# Patient Record
Sex: FEMALE | Race: WHITE | NOT HISPANIC OR LATINO
[De-identification: names, ages, dates, MRNs, and addresses within clinical notes are randomized per-mention and may not be internally consistent; named-entity substitution may affect disease eponyms.]

---

## 2018-11-24 ENCOUNTER — TRANSCRIPTION ENCOUNTER (OUTPATIENT)
Age: 53
End: 2018-11-24

## 2020-11-11 ENCOUNTER — TRANSCRIPTION ENCOUNTER (OUTPATIENT)
Age: 55
End: 2020-11-11

## 2022-04-11 ENCOUNTER — APPOINTMENT (OUTPATIENT)
Dept: ORTHOPEDIC SURGERY | Facility: CLINIC | Age: 57
End: 2022-04-11
Payer: COMMERCIAL

## 2022-04-11 VITALS — HEIGHT: 63 IN | WEIGHT: 120 LBS | BODY MASS INDEX: 21.26 KG/M2

## 2022-04-11 DIAGNOSIS — M79.641 PAIN IN RIGHT HAND: ICD-10-CM

## 2022-04-11 DIAGNOSIS — Z78.9 OTHER SPECIFIED HEALTH STATUS: ICD-10-CM

## 2022-04-11 DIAGNOSIS — S60.229A CONTUSION OF UNSPECIFIED HAND, INITIAL ENCOUNTER: ICD-10-CM

## 2022-04-11 PROBLEM — Z00.00 ENCOUNTER FOR PREVENTIVE HEALTH EXAMINATION: Status: ACTIVE | Noted: 2022-04-11

## 2022-04-11 PROCEDURE — 73130 X-RAY EXAM OF HAND: CPT | Mod: RT

## 2022-04-11 PROCEDURE — 99203 OFFICE O/P NEW LOW 30 MIN: CPT

## 2022-04-11 NOTE — PHYSICAL EXAM
[de-identified] : right hand: \par ttp 4th metacarpal\par FAROM\par NVID [FreeTextEntry1] : healed 4th MC fracture (old) no acute fractures

## 2023-03-01 ENCOUNTER — TRANSCRIPTION ENCOUNTER (OUTPATIENT)
Age: 58
End: 2023-03-01

## 2023-03-02 ENCOUNTER — INPATIENT (INPATIENT)
Facility: HOSPITAL | Age: 58
LOS: 7 days | Discharge: HOME | End: 2023-03-10
Attending: SURGERY | Admitting: SURGERY
Payer: COMMERCIAL

## 2023-03-02 ENCOUNTER — TRANSCRIPTION ENCOUNTER (OUTPATIENT)
Age: 58
End: 2023-03-02

## 2023-03-02 VITALS
OXYGEN SATURATION: 100 % | DIASTOLIC BLOOD PRESSURE: 54 MMHG | SYSTOLIC BLOOD PRESSURE: 97 MMHG | HEART RATE: 91 BPM | TEMPERATURE: 100 F | RESPIRATION RATE: 18 BRPM

## 2023-03-02 DIAGNOSIS — Z98.890 OTHER SPECIFIED POSTPROCEDURAL STATES: Chronic | ICD-10-CM

## 2023-03-02 DIAGNOSIS — K63.1 PERFORATION OF INTESTINE (NONTRAUMATIC): ICD-10-CM

## 2023-03-02 LAB
ALBUMIN SERPL ELPH-MCNC: 4.2 G/DL — SIGNIFICANT CHANGE UP (ref 3.3–5)
ALP SERPL-CCNC: 73 U/L — SIGNIFICANT CHANGE UP (ref 40–120)
ALT FLD-CCNC: 49 U/L — HIGH (ref 4–33)
ANION GAP SERPL CALC-SCNC: 13 MMOL/L — SIGNIFICANT CHANGE UP (ref 7–14)
APTT BLD: 29.9 SEC — SIGNIFICANT CHANGE UP (ref 27–36.3)
AST SERPL-CCNC: 40 U/L — HIGH (ref 4–32)
BASOPHILS # BLD AUTO: 0.02 K/UL — SIGNIFICANT CHANGE UP (ref 0–0.2)
BASOPHILS NFR BLD AUTO: 0.2 % — SIGNIFICANT CHANGE UP (ref 0–2)
BILIRUB SERPL-MCNC: 0.6 MG/DL — SIGNIFICANT CHANGE UP (ref 0.2–1.2)
BLD GP AB SCN SERPL QL: NEGATIVE — SIGNIFICANT CHANGE UP
BLOOD GAS ARTERIAL - LYTES,HGB,ICA,LACT RESULT: SIGNIFICANT CHANGE UP
BLOOD GAS VENOUS COMPREHENSIVE RESULT: SIGNIFICANT CHANGE UP
BUN SERPL-MCNC: 25 MG/DL — HIGH (ref 7–23)
CALCIUM SERPL-MCNC: 9 MG/DL — SIGNIFICANT CHANGE UP (ref 8.4–10.5)
CHLORIDE SERPL-SCNC: 101 MMOL/L — SIGNIFICANT CHANGE UP (ref 98–107)
CO2 SERPL-SCNC: 24 MMOL/L — SIGNIFICANT CHANGE UP (ref 22–31)
CREAT SERPL-MCNC: 0.7 MG/DL — SIGNIFICANT CHANGE UP (ref 0.5–1.3)
EGFR: 102 ML/MIN/1.73M2 — SIGNIFICANT CHANGE UP
EOSINOPHIL # BLD AUTO: 0 K/UL — SIGNIFICANT CHANGE UP (ref 0–0.5)
EOSINOPHIL NFR BLD AUTO: 0 % — SIGNIFICANT CHANGE UP (ref 0–6)
GLUCOSE SERPL-MCNC: 95 MG/DL — SIGNIFICANT CHANGE UP (ref 70–99)
HCT VFR BLD CALC: 39 % — SIGNIFICANT CHANGE UP (ref 34.5–45)
HGB BLD-MCNC: 12.2 G/DL — SIGNIFICANT CHANGE UP (ref 11.5–15.5)
IANC: 8.09 K/UL — HIGH (ref 1.8–7.4)
IMM GRANULOCYTES NFR BLD AUTO: 0.3 % — SIGNIFICANT CHANGE UP (ref 0–0.9)
INR BLD: 0.94 RATIO — SIGNIFICANT CHANGE UP (ref 0.88–1.16)
LYMPHOCYTES # BLD AUTO: 0.65 K/UL — LOW (ref 1–3.3)
LYMPHOCYTES # BLD AUTO: 7.2 % — LOW (ref 13–44)
MAGNESIUM SERPL-MCNC: 1.7 MG/DL — SIGNIFICANT CHANGE UP (ref 1.6–2.6)
MCHC RBC-ENTMCNC: 30.3 PG — SIGNIFICANT CHANGE UP (ref 27–34)
MCHC RBC-ENTMCNC: 31.3 GM/DL — LOW (ref 32–36)
MCV RBC AUTO: 97 FL — SIGNIFICANT CHANGE UP (ref 80–100)
MONOCYTES # BLD AUTO: 0.29 K/UL — SIGNIFICANT CHANGE UP (ref 0–0.9)
MONOCYTES NFR BLD AUTO: 3.2 % — SIGNIFICANT CHANGE UP (ref 2–14)
NEUTROPHILS # BLD AUTO: 8.09 K/UL — HIGH (ref 1.8–7.4)
NEUTROPHILS NFR BLD AUTO: 89.1 % — HIGH (ref 43–77)
NRBC # BLD: 0 /100 WBCS — SIGNIFICANT CHANGE UP (ref 0–0)
NRBC # FLD: 0 K/UL — SIGNIFICANT CHANGE UP (ref 0–0)
PLATELET # BLD AUTO: 175 K/UL — SIGNIFICANT CHANGE UP (ref 150–400)
POTASSIUM SERPL-MCNC: 3.4 MMOL/L — LOW (ref 3.5–5.3)
POTASSIUM SERPL-SCNC: 3.4 MMOL/L — LOW (ref 3.5–5.3)
PROT SERPL-MCNC: 6.6 G/DL — SIGNIFICANT CHANGE UP (ref 6–8.3)
PROTHROM AB SERPL-ACNC: 10.9 SEC — SIGNIFICANT CHANGE UP (ref 10.5–13.4)
RBC # BLD: 4.02 M/UL — SIGNIFICANT CHANGE UP (ref 3.8–5.2)
RBC # FLD: 12.8 % — SIGNIFICANT CHANGE UP (ref 10.3–14.5)
RH IG SCN BLD-IMP: POSITIVE — SIGNIFICANT CHANGE UP
SARS-COV-2 RNA SPEC QL NAA+PROBE: SIGNIFICANT CHANGE UP
SODIUM SERPL-SCNC: 138 MMOL/L — SIGNIFICANT CHANGE UP (ref 135–145)
WBC # BLD: 9.08 K/UL — SIGNIFICANT CHANGE UP (ref 3.8–10.5)
WBC # FLD AUTO: 9.08 K/UL — SIGNIFICANT CHANGE UP (ref 3.8–10.5)

## 2023-03-02 PROCEDURE — 99285 EMERGENCY DEPT VISIT HI MDM: CPT

## 2023-03-02 PROCEDURE — 74176 CT ABD & PELVIS W/O CONTRAST: CPT | Mod: 26,MA

## 2023-03-02 DEVICE — ARISTA 3GR: Type: IMPLANTABLE DEVICE | Status: FUNCTIONAL

## 2023-03-02 DEVICE — STAPLER COVIDIEN TA 60 BLUE: Type: IMPLANTABLE DEVICE | Status: FUNCTIONAL

## 2023-03-02 DEVICE — LIGATING CLIPS WECK HORIZON MEDIUM (BLUE) 24: Type: IMPLANTABLE DEVICE | Status: FUNCTIONAL

## 2023-03-02 DEVICE — STAPLER COVIDIEN ENDO GIA 80-3.8MM BLUE: Type: IMPLANTABLE DEVICE | Status: FUNCTIONAL

## 2023-03-02 DEVICE — LIGATING CLIPS WECK HORIZON LARGE (ORANGE) 24: Type: IMPLANTABLE DEVICE | Status: FUNCTIONAL

## 2023-03-02 RX ORDER — PIPERACILLIN AND TAZOBACTAM 4; .5 G/20ML; G/20ML
3.38 INJECTION, POWDER, LYOPHILIZED, FOR SOLUTION INTRAVENOUS EVERY 8 HOURS
Refills: 0 | Status: COMPLETED | OUTPATIENT
Start: 2023-03-02 | End: 2023-03-09

## 2023-03-02 RX ORDER — SODIUM CHLORIDE 9 MG/ML
1000 INJECTION, SOLUTION INTRAVENOUS ONCE
Refills: 0 | Status: DISCONTINUED | OUTPATIENT
Start: 2023-03-02 | End: 2023-03-02

## 2023-03-02 RX ORDER — NALBUPHINE HYDROCHLORIDE 10 MG/ML
2.5 INJECTION, SOLUTION INTRAMUSCULAR; INTRAVENOUS; SUBCUTANEOUS EVERY 6 HOURS
Refills: 0 | Status: DISCONTINUED | OUTPATIENT
Start: 2023-03-03 | End: 2023-03-06

## 2023-03-02 RX ORDER — MORPHINE SULFATE 50 MG/1
4 CAPSULE, EXTENDED RELEASE ORAL ONCE
Refills: 0 | Status: DISCONTINUED | OUTPATIENT
Start: 2023-03-02 | End: 2023-03-02

## 2023-03-02 RX ORDER — HYDROMORPHONE HYDROCHLORIDE 2 MG/ML
30 INJECTION INTRAMUSCULAR; INTRAVENOUS; SUBCUTANEOUS
Refills: 0 | Status: DISCONTINUED | OUTPATIENT
Start: 2023-03-03 | End: 2023-03-06

## 2023-03-02 RX ORDER — FENTANYL CITRATE 50 UG/ML
50 INJECTION INTRAVENOUS ONCE
Refills: 0 | Status: DISCONTINUED | OUTPATIENT
Start: 2023-03-02 | End: 2023-03-02

## 2023-03-02 RX ORDER — HYDROMORPHONE HYDROCHLORIDE 2 MG/ML
0.5 INJECTION INTRAMUSCULAR; INTRAVENOUS; SUBCUTANEOUS
Refills: 0 | Status: DISCONTINUED | OUTPATIENT
Start: 2023-03-03 | End: 2023-03-06

## 2023-03-02 RX ORDER — SODIUM CHLORIDE 9 MG/ML
1000 INJECTION, SOLUTION INTRAVENOUS
Refills: 0 | Status: DISCONTINUED | OUTPATIENT
Start: 2023-03-02 | End: 2023-03-05

## 2023-03-02 RX ORDER — PIPERACILLIN AND TAZOBACTAM 4; .5 G/20ML; G/20ML
3.38 INJECTION, POWDER, LYOPHILIZED, FOR SOLUTION INTRAVENOUS ONCE
Refills: 0 | Status: COMPLETED | OUTPATIENT
Start: 2023-03-02 | End: 2023-03-02

## 2023-03-02 RX ORDER — NALOXONE HYDROCHLORIDE 4 MG/.1ML
0.1 SPRAY NASAL
Refills: 0 | Status: DISCONTINUED | OUTPATIENT
Start: 2023-03-03 | End: 2023-03-10

## 2023-03-02 RX ORDER — ONDANSETRON 8 MG/1
4 TABLET, FILM COATED ORAL EVERY 6 HOURS
Refills: 0 | Status: DISCONTINUED | OUTPATIENT
Start: 2023-03-03 | End: 2023-03-08

## 2023-03-02 RX ORDER — SODIUM CHLORIDE 9 MG/ML
1000 INJECTION INTRAMUSCULAR; INTRAVENOUS; SUBCUTANEOUS ONCE
Refills: 0 | Status: COMPLETED | OUTPATIENT
Start: 2023-03-02 | End: 2023-03-02

## 2023-03-02 RX ADMIN — SODIUM CHLORIDE 1000 MILLILITER(S): 9 INJECTION INTRAMUSCULAR; INTRAVENOUS; SUBCUTANEOUS at 20:19

## 2023-03-02 RX ADMIN — FENTANYL CITRATE 50 MICROGRAM(S): 50 INJECTION INTRAVENOUS at 20:28

## 2023-03-02 RX ADMIN — FENTANYL CITRATE 50 MICROGRAM(S): 50 INJECTION INTRAVENOUS at 20:04

## 2023-03-02 RX ADMIN — SODIUM CHLORIDE 100 MILLILITER(S): 9 INJECTION, SOLUTION INTRAVENOUS at 22:05

## 2023-03-02 RX ADMIN — PIPERACILLIN AND TAZOBACTAM 200 GRAM(S): 4; .5 INJECTION, POWDER, LYOPHILIZED, FOR SOLUTION INTRAVENOUS at 20:29

## 2023-03-02 NOTE — H&P ADULT - HISTORY OF PRESENT ILLNESS
General Surgery Consult  Consulting surgical team: A TEAM SURGERY  Consulting attending: Dr. Lr    HPI:  57F hx of anxiety/depression, HTN and diverticulitis x 1 episode last year [facility] with possible fistula here with 12 hours of severe lower abdominal pain and outpatient CT ordered by her GI Dr. Nova with "free air", sent to ED. Had cscope 2-3 months ago and MRI after 2022 diverticulitis episode, was told there was no concern for fistula now. Denies fever, n/v/c/d, dysuria. Last PO intake yesterday. Last BM nonbloody loose here in ED. In ED, afebrile, HR normal, sbp 97, lower abdominal severe tenderness with rebound/guarding softly distended, upper abdomen nontender, labs and CT noncontrast (anaphylactic iodine contrast allergy) pending.    patient is an RN    PAST MEDICAL HISTORY:  anxiety/depression  ?HTN    PAST SURGICAL HISTORY:  lap bilateral inguinal hernia repair with mesh 2-3 years ago at [facility]    MEDICATIONS:  fentaNYL    Injectable 50 MICROGram(s) IV Push Once  piperacillin/tazobactam IVPB... 3.375 Gram(s) IV Intermittent once      ALLERGIES:  ciprofloxacin (Rash)      VITALS & I/Os:  Vital Signs Last 24 Hrs  T(C): 37.5 (02 Mar 2023 19:42), Max: 37.5 (02 Mar 2023 19:42)  T(F): 99.5 (02 Mar 2023 19:42), Max: 99.5 (02 Mar 2023 19:42)  HR: 85 (02 Mar 2023 20:16) (85 - 91)  BP: 97/62 (02 Mar 2023 20:16) (97/54 - 97/62)  BP(mean): --  RR: 17 (02 Mar 2023 20:16) (17 - 18)  SpO2: 100% (02 Mar 2023 20:16) (100% - 100%)    Parameters below as of 02 Mar 2023 20:16  Patient On (Oxygen Delivery Method): room air        I&O's Summary      PHYSICAL EXAM:  General: uncomfortable  Respiratory: Nonlabored  Cardiovascular: appears well perfused  Abdominal: Soft, distended, RLQ/LLQ severe tenderness +rebound +guarding, RUQ/LUQ nontender.   laparoscopic incisions well-healed  Extremities: Warm    LABS:          Lactate:                  IMAGING:                                                                                               General Surgery Consult  Consulting surgical team: A TEAM SURGERY  Consulting attending: Dr. Lr    HPI:  57F hx of anxiety/depression, HTN and diverticulitis x 1 episode last year [facility] [place] with possible fistula here with 12 hours of severe lower abdominal pain and outpatient CT ordered by her GI Dr. Nova with "free air", sent to ED. Had cscope 2-3 months ago and MRI after 2022 diverticulitis episode, was told there was no concern for fistula now. Denies fever, n/v/c/d, dysuria. Last PO intake yesterday. Last BM nonbloody loose here in ED. In ED, afebrile, HR normal, sbp 97, lower abdominal severe tenderness with rebound/guarding softly distended, upper abdomen nontender, labs and CT noncontrast (anaphylactic iodine contrast allergy) pending.    patient is an RN    PAST MEDICAL HISTORY:  anxiety/depression  ?HTN    PAST SURGICAL HISTORY:  lap bilateral inguinal hernia repair with mesh 2-3 years ago at [facility] [place]    MEDICATIONS:  fentaNYL    Injectable 50 MICROGram(s) IV Push Once  piperacillin/tazobactam IVPB... 3.375 Gram(s) IV Intermittent once      ALLERGIES:  ciprofloxacin (Rash)      VITALS & I/Os:  Vital Signs Last 24 Hrs  T(C): 37.5 (02 Mar 2023 19:42), Max: 37.5 (02 Mar 2023 19:42)  T(F): 99.5 (02 Mar 2023 19:42), Max: 99.5 (02 Mar 2023 19:42)  HR: 85 (02 Mar 2023 20:16) (85 - 91)  BP: 97/62 (02 Mar 2023 20:16) (97/54 - 97/62)  BP(mean): --  RR: 17 (02 Mar 2023 20:16) (17 - 18)  SpO2: 100% (02 Mar 2023 20:16) (100% - 100%)    Parameters below as of 02 Mar 2023 20:16  Patient On (Oxygen Delivery Method): room air        I&O's Summary      PHYSICAL EXAM:  General: uncomfortable  Respiratory: Nonlabored  Cardiovascular: appears well perfused  Abdominal: Soft, distended, RLQ/LLQ severe tenderness +rebound +guarding, RUQ/LUQ nontender.   laparoscopic incisions well-healed  Extremities: Warm    LABS:          Lactate:                  IMAGING:    ACC: [id number] EXAM:  CT ABDOMEN AND PELVIS   ORDERED BY: SHERON CALIX     PROCEDURE DATE:  03/02/2023          INTERPRETATION:  CLINICAL INFORMATION: Abdominal pain, concern for bowel   perforation, perforated diverticulitis diagnosed in study at outside   institution, where oral contrast was used    COMPARISON: None.    CONTRAST/COMPLICATIONS:  IV Contrast: NONE  Oral Contrast: NONE  Complications: None reported at time of study completion    PROCEDURE:  CT of the Abdomen and Pelvis was performed.  Sagittal and coronal reformats were performed.    FINDINGS:  LOWER CHEST: Left basilar consolidative airspace opacity, may reflect   atelectasis and/or pneumonia.    LIVER: Focal fatty infiltration at the falciform ligament.  BILE DUCTS: Normal caliber.  GALLBLADDER: Within normal limits.  SPLEEN: Within normal limits.  PANCREAS: Within normal limits.  ADRENALS: Within normal limits.  KIDNEYS/URETERS: No renal stones or hydronephrosis.    BLADDER: Within normal limits.  REPRODUCTIVE ORGANS: Uterus and adnexa within normal limits.    BOWEL: Colonic diverticulosis. Colonic wall thickening involving the   distal descending and proximal sigmoid colon as well as pericolonic   inflammation. Couple extraluminal collections containing extravasated   oral contrast material measuring 4.7 x 2.3 cm, just inferior to the level   of the left lower pole of the kidney and a smaller collection more   inferiorly measuring approximately 2.1 x 1.5 cm. Small to moderate   scattered pneumoperitoneum, as prominent anterior to the liver. Small   bowel colonic wall thickening adjacent to the inflamed colon, likely   reactive. No bowel obstruction. Appendix is normal.  PERITONEUM: Small volume pneumoperitoneum. Left mid abdominal fluid   collection as described above.  VESSELS: Within normal limits.  RETROPERITONEUM/LYMPH NODES: No lymphadenopathy.  ABDOMINAL WALL: Tiny fat-containing umbilical hernia.  BONES: Degenerative changes. Old healed left lateral rib fracture   deformities.    IMPRESSION:  Perforated descending/sigmoid colonic diverticulitis/colitis with small   to moderate pneumoperitoneum and a couple extraluminal collections of   extravasated oral contrast material within the left hemiabdomen.    Left basilar consolidative airspace opacity may reflect atelectasis   and/or pneumonia.    Findings were discussed with Dr. CALIX 3/2/2023 9:31 PM by Dr. Galloway with verbal confirmation.    --- End of Report ---          TELLY GALLOWAY MD; Resident Radiologist  This document has been electronically signed.  ROSELIA FARRIS MD; Attending Radiologist  This document has been electronically signed. Mar  2 2023  9:50PM                                                                                               General Surgery Consult  Consulting surgical team: A TEAM SURGERY  Consulting attending: Dr. Lr    HPI:  57F smoker hx of HTN and diverticulitis x 1 episode 5/2022 managed outpatient [facility] with possible fistula here with 12 hours of severe lower abdominal pain and outpatient CT ordered by her GI Dr. Nova with "free air", sent to ED. Had cscope 2-3 months ago and MRI after 5/2022 diverticulitis episode, was told there was no concern for fistula now. Denies fever, n/v/c/d, dysuria. Last PO intake yesterday. Last BM nonbloody loose here in ED. In ED, afebrile, HR normal, sbp 97, lower abdominal severe tenderness with rebound/guarding softly distended, upper abdomen nontender, labs and CT noncontrast (anaphylactic iodine contrast allergy) pending.    patient is an RN    PAST MEDICAL HISTORY:  ?HTN    PAST SURGICAL HISTORY:  cervical spine surgery  lap bilateral inguinal hernia repair with mesh 2-3 years ago at [facility]    MEDICATIONS:  fentaNYL    Injectable 50 MICROGram(s) IV Push Once  piperacillin/tazobactam IVPB... 3.375 Gram(s) IV Intermittent once      ALLERGIES:  ciprofloxacin (Rash)      VITALS & I/Os:  Vital Signs Last 24 Hrs  T(C): 37.5 (02 Mar 2023 19:42), Max: 37.5 (02 Mar 2023 19:42)  T(F): 99.5 (02 Mar 2023 19:42), Max: 99.5 (02 Mar 2023 19:42)  HR: 85 (02 Mar 2023 20:16) (85 - 91)  BP: 97/62 (02 Mar 2023 20:16) (97/54 - 97/62)  BP(mean): --  RR: 17 (02 Mar 2023 20:16) (17 - 18)  SpO2: 100% (02 Mar 2023 20:16) (100% - 100%)    Parameters below as of 02 Mar 2023 20:16  Patient On (Oxygen Delivery Method): room air        I&O's Summary      PHYSICAL EXAM:  General: uncomfortable  Respiratory: Nonlabored  Cardiovascular: appears well perfused  Abdominal: Soft, distended, RLQ/LLQ severe tenderness +rebound +guarding, RUQ/LUQ nontender.   laparoscopic incisions well-healed  Extremities: Warm    LABS:          Lactate:                  IMAGING:    ACC: [id number] EXAM:  CT ABDOMEN AND PELVIS   ORDERED BY: SHERON CALIX     PROCEDURE DATE:  03/02/2023          INTERPRETATION:  CLINICAL INFORMATION: Abdominal pain, concern for bowel   perforation, perforated diverticulitis diagnosed in study at outside   institution, where oral contrast was used    COMPARISON: None.    CONTRAST/COMPLICATIONS:  IV Contrast: NONE  Oral Contrast: NONE  Complications: None reported at time of study completion    PROCEDURE:  CT of the Abdomen and Pelvis was performed.  Sagittal and coronal reformats were performed.    FINDINGS:  LOWER CHEST: Left basilar consolidative airspace opacity, may reflect   atelectasis and/or pneumonia.    LIVER: Focal fatty infiltration at the falciform ligament.  BILE DUCTS: Normal caliber.  GALLBLADDER: Within normal limits.  SPLEEN: Within normal limits.  PANCREAS: Within normal limits.  ADRENALS: Within normal limits.  KIDNEYS/URETERS: No renal stones or hydronephrosis.    BLADDER: Within normal limits.  REPRODUCTIVE ORGANS: Uterus and adnexa within normal limits.    BOWEL: Colonic diverticulosis. Colonic wall thickening involving the   distal descending and proximal sigmoid colon as well as pericolonic   inflammation. Couple extraluminal collections containing extravasated   oral contrast material measuring 4.7 x 2.3 cm, just inferior to the level   of the left lower pole of the kidney and a smaller collection more   inferiorly measuring approximately 2.1 x 1.5 cm. Small to moderate   scattered pneumoperitoneum, as prominent anterior to the liver. Small   bowel colonic wall thickening adjacent to the inflamed colon, likely   reactive. No bowel obstruction. Appendix is normal.  PERITONEUM: Small volume pneumoperitoneum. Left mid abdominal fluid   collection as described above.  VESSELS: Within normal limits.  RETROPERITONEUM/LYMPH NODES: No lymphadenopathy.  ABDOMINAL WALL: Tiny fat-containing umbilical hernia.  BONES: Degenerative changes. Old healed left lateral rib fracture   deformities.    IMPRESSION:  Perforated descending/sigmoid colonic diverticulitis/colitis with small   to moderate pneumoperitoneum and a couple extraluminal collections of   extravasated oral contrast material within the left hemiabdomen.    Left basilar consolidative airspace opacity may reflect atelectasis   and/or pneumonia.    Findings were discussed with Dr. CALIX 3/2/2023 9:31 PM by Dr. Galloway with verbal confirmation.    --- End of Report ---          TELLY GALLOWAY MD; Resident Radiologist  This document has been electronically signed.  ROSELIA FARRIS MD; Attending Radiologist  This document has been electronically signed. Mar  2 2023  9:50PM

## 2023-03-02 NOTE — H&P ADULT - ASSESSMENT
57F hx of diverticulitis with 1 day of severe lower abdominal pain and outpatient CT suggestive of perforated diverticulitis    Plan:  -pending CT ab pelvis noncontrast  -npo/ivf/zosyn  LR bolus now  -serial ab exams  -f/u labs and CT    Discussed with Dr. Be FLANAGAN TEAM SURGERY  [id number] 57F hx of diverticulitis with 1 day of severe lower abdominal pain and outpatient CT suggestive of perforated diverticulitis    Plan:  -pending CT ab pelvis noncontrast  AFTER ct scan, admit to Dr. Lr  -npo/ivf/zosyn  LR bolus now  -serial ab exams  -f/u labs and CT    Discussed with Dr. Be FLANAGAN TEAM SURGERY  [id number] 57F hx of diverticulitis with 1 day of severe lower abdominal pain and outpatient CT suggestive of perforated diverticulitis    Plan:  -admit to Dr. Lr  -npo/ivf/zosyn  LR bolus now  -serial ab exams  -f/u labs and CT  likely OR for possible frida's    Discussed with Dr. Lr    A TEAM SURGERY  [id number] 57F hx of diverticulitis with 1 day of severe lower abdominal pain and outpatient CT suggestive of perforated diverticulitis    Plan:  -admit to Dr. Lr  -npo/ivf/zosyn  LR bolus now  -serial ab exams  -f/u labs and CT  OR for possible frida's    Discussed with Dr. Lr    A TEAM SURGERY  [id number] 57F hx of diverticulitis with 1 day of severe lower abdominal pain and outpatient CT suggestive of perforated diverticulitis    Plan:  -admit to Dr. Lr  -npo/ivf/zosyn  LR bolus now  -serial ab exams  -f/u labs and CT  OR for possible frida's  -will need medication dosage of anxiety meds verified post op    Discussed with Dr. Be FLANAGAN TEAM SURGERY  [id number]

## 2023-03-02 NOTE — ED PROVIDER NOTE - PROGRESS NOTE DETAILS
outpt ct pos for perforated sigmoid diverticulitis. pt p/w 6/10 pain but otherwise hd stable. tba to surgical service.

## 2023-03-02 NOTE — ED ADULT NURSE REASSESSMENT NOTE - NS ED NURSE REASSESS COMMENT FT1
Pt in room 17. a&Ox4, amb at baseline. Vitals improved. Pt endorsing pain is a 5/10 post medicating with fentanyl. Currently awaiting CT read. Pt aware. No other complaints made

## 2023-03-02 NOTE — ED PROVIDER NOTE - PHYSICAL EXAMINATION
GENERAL: in pain  HEAD: normocephalic, atraumatic  HEENT: normal conjunctiva, oral mucosa moist, uvula midline  CARDIAC: regular rate and rhythm, normal S1S2, no appreciable murmurs  PULM: normal breath sounds, clear to ascultation bilaterally, no rales, rhonchi, wheezing  GI: abdomen nondistended, guarding, b/l LQ ttp  : no CVA tenderness b/l  NEURO: moving all 4 extremities, no focal deficits, normal speech, AAOx3   MSK: no peripheral edema, no calf tenderness b/l  SKIN: well-perfused, extremities warm  PSYCH: appropriate mood and affect

## 2023-03-02 NOTE — H&P ADULT - NSICDXPASTMEDICALHX_GEN_ALL_CORE_FT
PAST MEDICAL HISTORY:  Anxiety and depression     Hypertension      PAST MEDICAL HISTORY:  Hypertension

## 2023-03-02 NOTE — ED PROVIDER NOTE - OBJECTIVE STATEMENT
57-year-old female with history of diverticulosis presents with acute left lower quadrant abdominal pain.  Patient reports that she has a history of diverticulitis.  Patient was doing well until earlier today when she felt sudden onset abdominal pain.  Patient was sent for an emergent CT of her abdomen and found to have a bowel perforation and sent to the ED.  Otherwise patient has had no headache, chest pain, shortness of breath, vomiting, diarrhea, peripheral edema, fever/chills.

## 2023-03-02 NOTE — ED ADULT NURSE NOTE - OBJECTIVE STATEMENT
pt received to rm 17  , a&ox 4, ambulatory , pmh of hernia and diverticulitis  , p/w sent from MD with CT + for perforated colon . Pt breathing even and unlabored on room air. Denies fever, chills, cough, SOB, chest pain, palpitations, dizziness, N/V/D, constipation, numbness, tingling. IV placed. Labs collected and sent.  pt educated on fall precautions and confirms understanding via teach back method. Stretcher locked in lowest position with siderails up x2. Call bell and personal items within reach.

## 2023-03-02 NOTE — ED ADULT TRIAGE NOTE - CHIEF COMPLAINT QUOTE
Pt AOX4 c/o abdominal pain, had a CT today showed perforated  colon pt sent by GI MD Nova told to come to ER to meet surgeon Ernst for immediate eval

## 2023-03-02 NOTE — ED PROVIDER NOTE - CLINICAL SUMMARY MEDICAL DECISION MAKING FREE TEXT BOX
57-year-old female with a history of diverticulosis presents with a sigmoid bowel perforation.  Preop labs, repeat CT abdomen and pelvis ordered.  Admit to surgery.

## 2023-03-03 LAB
ALBUMIN SERPL ELPH-MCNC: 3.3 G/DL — SIGNIFICANT CHANGE UP (ref 3.3–5)
ALP SERPL-CCNC: 53 U/L — SIGNIFICANT CHANGE UP (ref 40–120)
ALT FLD-CCNC: 46 U/L — HIGH (ref 4–33)
ANION GAP SERPL CALC-SCNC: 11 MMOL/L — SIGNIFICANT CHANGE UP (ref 7–14)
ANION GAP SERPL CALC-SCNC: 9 MMOL/L — SIGNIFICANT CHANGE UP (ref 7–14)
AST SERPL-CCNC: 43 U/L — HIGH (ref 4–32)
BILIRUB SERPL-MCNC: 0.6 MG/DL — SIGNIFICANT CHANGE UP (ref 0.2–1.2)
BUN SERPL-MCNC: 18 MG/DL — SIGNIFICANT CHANGE UP (ref 7–23)
BUN SERPL-MCNC: 18 MG/DL — SIGNIFICANT CHANGE UP (ref 7–23)
CALCIUM SERPL-MCNC: 7.8 MG/DL — LOW (ref 8.4–10.5)
CALCIUM SERPL-MCNC: 8.3 MG/DL — LOW (ref 8.4–10.5)
CHLORIDE SERPL-SCNC: 103 MMOL/L — SIGNIFICANT CHANGE UP (ref 98–107)
CHLORIDE SERPL-SCNC: 103 MMOL/L — SIGNIFICANT CHANGE UP (ref 98–107)
CO2 SERPL-SCNC: 22 MMOL/L — SIGNIFICANT CHANGE UP (ref 22–31)
CO2 SERPL-SCNC: 24 MMOL/L — SIGNIFICANT CHANGE UP (ref 22–31)
CREAT SERPL-MCNC: 0.47 MG/DL — LOW (ref 0.5–1.3)
CREAT SERPL-MCNC: 0.57 MG/DL — SIGNIFICANT CHANGE UP (ref 0.5–1.3)
EGFR: 107 ML/MIN/1.73M2 — SIGNIFICANT CHANGE UP
EGFR: 112 ML/MIN/1.73M2 — SIGNIFICANT CHANGE UP
GLUCOSE SERPL-MCNC: 109 MG/DL — HIGH (ref 70–99)
GLUCOSE SERPL-MCNC: 116 MG/DL — HIGH (ref 70–99)
GRAM STN FLD: SIGNIFICANT CHANGE UP
HCT VFR BLD CALC: 35.1 % — SIGNIFICANT CHANGE UP (ref 34.5–45)
HCT VFR BLD CALC: 36.7 % — SIGNIFICANT CHANGE UP (ref 34.5–45)
HGB BLD-MCNC: 11.2 G/DL — LOW (ref 11.5–15.5)
HGB BLD-MCNC: 12 G/DL — SIGNIFICANT CHANGE UP (ref 11.5–15.5)
MAGNESIUM SERPL-MCNC: 1.4 MG/DL — LOW (ref 1.6–2.6)
MAGNESIUM SERPL-MCNC: 3.1 MG/DL — HIGH (ref 1.6–2.6)
MCHC RBC-ENTMCNC: 30.8 PG — SIGNIFICANT CHANGE UP (ref 27–34)
MCHC RBC-ENTMCNC: 31.3 PG — SIGNIFICANT CHANGE UP (ref 27–34)
MCHC RBC-ENTMCNC: 31.9 GM/DL — LOW (ref 32–36)
MCHC RBC-ENTMCNC: 32.7 GM/DL — SIGNIFICANT CHANGE UP (ref 32–36)
MCV RBC AUTO: 95.8 FL — SIGNIFICANT CHANGE UP (ref 80–100)
MCV RBC AUTO: 96.4 FL — SIGNIFICANT CHANGE UP (ref 80–100)
NRBC # BLD: 0 /100 WBCS — SIGNIFICANT CHANGE UP (ref 0–0)
NRBC # BLD: 0 /100 WBCS — SIGNIFICANT CHANGE UP (ref 0–0)
NRBC # FLD: 0 K/UL — SIGNIFICANT CHANGE UP (ref 0–0)
NRBC # FLD: 0 K/UL — SIGNIFICANT CHANGE UP (ref 0–0)
PHOSPHATE SERPL-MCNC: 3.3 MG/DL — SIGNIFICANT CHANGE UP (ref 2.5–4.5)
PHOSPHATE SERPL-MCNC: 3.5 MG/DL — SIGNIFICANT CHANGE UP (ref 2.5–4.5)
PLATELET # BLD AUTO: 160 K/UL — SIGNIFICANT CHANGE UP (ref 150–400)
PLATELET # BLD AUTO: 162 K/UL — SIGNIFICANT CHANGE UP (ref 150–400)
POTASSIUM SERPL-MCNC: 3.9 MMOL/L — SIGNIFICANT CHANGE UP (ref 3.5–5.3)
POTASSIUM SERPL-MCNC: 4 MMOL/L — SIGNIFICANT CHANGE UP (ref 3.5–5.3)
POTASSIUM SERPL-SCNC: 3.9 MMOL/L — SIGNIFICANT CHANGE UP (ref 3.5–5.3)
POTASSIUM SERPL-SCNC: 4 MMOL/L — SIGNIFICANT CHANGE UP (ref 3.5–5.3)
PROT SERPL-MCNC: 5.1 G/DL — LOW (ref 6–8.3)
RBC # BLD: 3.64 M/UL — LOW (ref 3.8–5.2)
RBC # BLD: 3.83 M/UL — SIGNIFICANT CHANGE UP (ref 3.8–5.2)
RBC # FLD: 13 % — SIGNIFICANT CHANGE UP (ref 10.3–14.5)
RBC # FLD: 13.2 % — SIGNIFICANT CHANGE UP (ref 10.3–14.5)
SODIUM SERPL-SCNC: 134 MMOL/L — LOW (ref 135–145)
SODIUM SERPL-SCNC: 138 MMOL/L — SIGNIFICANT CHANGE UP (ref 135–145)
SPECIMEN SOURCE: SIGNIFICANT CHANGE UP
WBC # BLD: 7.52 K/UL — SIGNIFICANT CHANGE UP (ref 3.8–10.5)
WBC # BLD: 9.95 K/UL — SIGNIFICANT CHANGE UP (ref 3.8–10.5)
WBC # FLD AUTO: 7.52 K/UL — SIGNIFICANT CHANGE UP (ref 3.8–10.5)
WBC # FLD AUTO: 9.95 K/UL — SIGNIFICANT CHANGE UP (ref 3.8–10.5)

## 2023-03-03 PROCEDURE — 88307 TISSUE EXAM BY PATHOLOGIST: CPT | Mod: 26

## 2023-03-03 RX ORDER — HYDROMORPHONE HYDROCHLORIDE 2 MG/ML
1 INJECTION INTRAMUSCULAR; INTRAVENOUS; SUBCUTANEOUS
Refills: 0 | Status: DISCONTINUED | OUTPATIENT
Start: 2023-03-03 | End: 2023-03-03

## 2023-03-03 RX ORDER — MAGNESIUM SULFATE 500 MG/ML
2 VIAL (ML) INJECTION
Refills: 0 | Status: COMPLETED | OUTPATIENT
Start: 2023-03-03 | End: 2023-03-03

## 2023-03-03 RX ORDER — MAGNESIUM SULFATE 500 MG/ML
2 VIAL (ML) INJECTION ONCE
Refills: 0 | Status: COMPLETED | OUTPATIENT
Start: 2023-03-03 | End: 2023-03-03

## 2023-03-03 RX ORDER — ONDANSETRON 8 MG/1
4 TABLET, FILM COATED ORAL ONCE
Refills: 0 | Status: DISCONTINUED | OUTPATIENT
Start: 2023-03-03 | End: 2023-03-03

## 2023-03-03 RX ORDER — ACETAMINOPHEN 500 MG
1000 TABLET ORAL EVERY 6 HOURS
Refills: 0 | Status: COMPLETED | OUTPATIENT
Start: 2023-03-03 | End: 2023-03-03

## 2023-03-03 RX ORDER — ENOXAPARIN SODIUM 100 MG/ML
40 INJECTION SUBCUTANEOUS EVERY 24 HOURS
Refills: 0 | Status: DISCONTINUED | OUTPATIENT
Start: 2023-03-03 | End: 2023-03-10

## 2023-03-03 RX ORDER — KETOROLAC TROMETHAMINE 30 MG/ML
30 SYRINGE (ML) INJECTION EVERY 6 HOURS
Refills: 0 | Status: DISCONTINUED | OUTPATIENT
Start: 2023-03-03 | End: 2023-03-03

## 2023-03-03 RX ORDER — HYDROMORPHONE HYDROCHLORIDE 2 MG/ML
0.5 INJECTION INTRAMUSCULAR; INTRAVENOUS; SUBCUTANEOUS
Refills: 0 | Status: DISCONTINUED | OUTPATIENT
Start: 2023-03-03 | End: 2023-03-03

## 2023-03-03 RX ADMIN — Medication 400 MILLIGRAM(S): at 05:27

## 2023-03-03 RX ADMIN — HYDROMORPHONE HYDROCHLORIDE 30 MILLILITER(S): 2 INJECTION INTRAMUSCULAR; INTRAVENOUS; SUBCUTANEOUS at 08:06

## 2023-03-03 RX ADMIN — Medication 400 MILLIGRAM(S): at 23:46

## 2023-03-03 RX ADMIN — HYDROMORPHONE HYDROCHLORIDE 30 MILLILITER(S): 2 INJECTION INTRAMUSCULAR; INTRAVENOUS; SUBCUTANEOUS at 20:19

## 2023-03-03 RX ADMIN — Medication 400 MILLIGRAM(S): at 17:41

## 2023-03-03 RX ADMIN — SODIUM CHLORIDE 125 MILLILITER(S): 9 INJECTION, SOLUTION INTRAVENOUS at 15:29

## 2023-03-03 RX ADMIN — Medication 1000 MILLIGRAM(S): at 13:00

## 2023-03-03 RX ADMIN — Medication 400 MILLIGRAM(S): at 11:50

## 2023-03-03 RX ADMIN — PIPERACILLIN AND TAZOBACTAM 25 GRAM(S): 4; .5 INJECTION, POWDER, LYOPHILIZED, FOR SOLUTION INTRAVENOUS at 15:30

## 2023-03-03 RX ADMIN — Medication 30 MILLIGRAM(S): at 05:28

## 2023-03-03 RX ADMIN — PIPERACILLIN AND TAZOBACTAM 25 GRAM(S): 4; .5 INJECTION, POWDER, LYOPHILIZED, FOR SOLUTION INTRAVENOUS at 05:27

## 2023-03-03 RX ADMIN — Medication 25 GRAM(S): at 07:49

## 2023-03-03 RX ADMIN — PIPERACILLIN AND TAZOBACTAM 25 GRAM(S): 4; .5 INJECTION, POWDER, LYOPHILIZED, FOR SOLUTION INTRAVENOUS at 22:45

## 2023-03-03 RX ADMIN — Medication 25 GRAM(S): at 09:56

## 2023-03-03 RX ADMIN — HYDROMORPHONE HYDROCHLORIDE 30 MILLILITER(S): 2 INJECTION INTRAMUSCULAR; INTRAVENOUS; SUBCUTANEOUS at 03:02

## 2023-03-03 RX ADMIN — Medication 1000 MILLIGRAM(S): at 18:10

## 2023-03-03 RX ADMIN — Medication 30 MILLIGRAM(S): at 05:58

## 2023-03-03 RX ADMIN — Medication 1000 MILLIGRAM(S): at 05:57

## 2023-03-03 RX ADMIN — HYDROMORPHONE HYDROCHLORIDE 30 MILLILITER(S): 2 INJECTION INTRAMUSCULAR; INTRAVENOUS; SUBCUTANEOUS at 04:47

## 2023-03-03 RX ADMIN — Medication 25 GRAM(S): at 03:30

## 2023-03-03 NOTE — PROGRESS NOTE ADULT - SUBJECTIVE AND OBJECTIVE BOX
Anesthesia Pain Management Service    SUBJECTIVE: Patient is doing well with IV PCA and no significant problems reported. Patient states she is worried about pressing IV PCA button too much but it helps her. Reviewed IV PCA use with patient.    Pain Scale Score	At rest: ___ 	With Activity: ___ 	[X ] Refer to charted pain scores    THERAPY:    [ ] IV PCA Morphine		[ ] 5 mg/mL	[ ] 1 mg/mL  [X ] IV PCA Hydromorphone	[ ] 5 mg/mL	[X ] 1 mg/mL  [ ] IV PCA Fentanyl		[ ] 50 micrograms/mL    Demand dose __0.2_ lockout __6_ (minutes) Continuous Rate _0__ Total: __1.2_  mg used (in past 24 hours)      MEDICATIONS  (STANDING):  acetaminophen   IVPB .. 1000 milliGRAM(s) IV Intermittent every 6 hours  enoxaparin Injectable 40 milliGRAM(s) SubCutaneous every 24 hours  HYDROmorphone PCA (1 mG/mL) 30 milliLiter(s) PCA Continuous PCA Continuous  lactated ringers. 1000 milliLiter(s) (125 mL/Hr) IV Continuous <Continuous>  piperacillin/tazobactam IVPB.. 3.375 Gram(s) IV Intermittent every 8 hours    MEDICATIONS  (PRN):  HYDROmorphone PCA (1 mG/mL) Rescue Clinician Bolus 0.5 milliGRAM(s) IV Push every 15 minutes PRN for Pain Scale GREATER THAN 6  nalbuphine Injectable 2.5 milliGRAM(s) IV Push every 6 hours PRN Pruritus  naloxone Injectable 0.1 milliGRAM(s) IV Push every 3 minutes PRN For ANY of the following changes in patient status:  A. RR LESS THAN 10 breaths per minute, B. Oxygen saturation LESS THAN 90%, C. Sedation score of 6  ondansetron Injectable 4 milliGRAM(s) IV Push every 6 hours PRN Nausea      OBJECTIVE: Patient sitting up in bed, NGT in place.  at bedside.    Sedation Score:	[ X] Alert	[ ] Drowsy 	[ ] Arousable	[ ] Asleep	[ ] Unresponsive    Side Effects:	[X ] None	[ ] Nausea	[ ] Vomiting	[ ] Pruritus  		[ ] Other:    Vital Signs Last 24 Hrs  T(C): 36.8 (03 Mar 2023 10:06), Max: 37.6 (02 Mar 2023 20:16)  T(F): 98.3 (03 Mar 2023 10:06), Max: 99.6 (02 Mar 2023 20:16)  HR: 86 (03 Mar 2023 10:06) (84 - 91)  BP: 104/56 (03 Mar 2023 10:06) (97/54 - 115/69)  BP(mean): 76 (03 Mar 2023 04:00) (67 - 82)  RR: 17 (03 Mar 2023 10:06) (11 - 18)  SpO2: 96% (03 Mar 2023 10:06) (95% - 100%)    Parameters below as of 03 Mar 2023 10:06  Patient On (Oxygen Delivery Method): room air        ASSESSMENT/ PLAN    Therapy to  be:	[ X] Continue   [ ] Discontinued   [ ] Change to prn Analgesics    Documentation and Verification of current medications:   [X] Done	[ ] Not done, not elligible    Comments: Continue IV PCA. Recommend non-opioid adjuvant analgesics to be used when possible and when allowed by primary surgical team.    Progress Note written now but Patient was seen earlier.

## 2023-03-03 NOTE — ADVANCED PRACTICE NURSE CONSULT - RECOMMEDATIONS
Supplies utilized:   Liquid barrier film    Skin barrier ring- item # [id number]  One piece drainable pouch- item #[id number]    Ostomy team will continue to follow.  Please contact Wound/Ostomy Care Service Line if we can be of further assistance (ext [phone]).

## 2023-03-03 NOTE — PATIENT PROFILE ADULT - FALL HARM RISK - HARM RISK INTERVENTIONS

## 2023-03-03 NOTE — BRIEF OPERATIVE NOTE - NSICDXBRIEFPOSTOP_GEN_ALL_CORE_FT
POST-OP DIAGNOSIS:  Diverticulitis of large intestine with perforation and abscess 03-Mar-2023 01:46:47  Jamia Erickson

## 2023-03-03 NOTE — PROGRESS NOTE ADULT - SUBJECTIVE AND OBJECTIVE BOX
Surgery [facility] A Team Daily Progress Note   MIRNADA NEAL | [id number]  --------------------------------------------------------------------------------------------------------------------  SUBJECTIVE / 24H EVENTS  Patient seen and examined on morning rounds. No acute events overnight.  --------------------------------------------------------------------------------------------------------------------  OBJECTIVE: Pt recovering well on the floors w/ NGNATALIA and Espino.    VITAL SIGNS:  T(C): 36.5 (03-03-23 @ 05:12), Max: 37.6 (03-02-23 @ 20:16)  HR: 86 (03-03-23 @ 05:12) (84 - 91)  BP: 100/65 (03-03-23 @ 05:12) (97/54 - 115/69)  RR: 17 (03-03-23 @ 05:12) (11 - 18)  SpO2: 95% (03-03-23 @ 05:12) (95% - 100%)  Daily     Daily       PHYSICAL EXAM:  Gen: NAD  LS: Respirations unlabored.   Card: RRR.   GI: Soft. LQ TTP. Minimally distended.   Ext: Warm, well perfused      03-02-23 @ 07:01  -  03-03-23 @ 07:00  --------------------------------------------------------  IN:    Lactated Ringers: 600 mL  Total IN: 600 mL    OUT:    Bulb (mL): 100 mL    Colostomy (mL): 0 mL    Indwelling Catheter - Urethral (mL): 400 mL    Oral Fluid: 0 mL    Voided (mL): 75 mL  Total OUT: 575 mL    Total NET: 25 mL          LAB VALUES:  03-03    134<L>  |  103  |  18  ----------------------------<  116<H>  3.9   |  22  |  0.47<L>    Ca    7.8<L>      03 Mar 2023 02:15  Phos  3.3     03-03  Mg     1.40     03-03    TPro  5.1<L>  /  Alb  3.3  /  TBili  0.6  /  DBili  x   /  AST  43<H>  /  ALT  46<H>  /  AlkPhos  53  03-03                               12.0   7.52  )-----------( 162      ( 03 Mar 2023 02:15 )             36.7     LIVER FUNCTIONS - ( 03 Mar 2023 02:15 )  Alb: 3.3 g/dL / Pro: 5.1 g/dL / ALK PHOS: 53 U/L / ALT: 46 U/L / AST: 43 U/L / GGT: x           PT/INR - ( 02 Mar 2023 20:05 )   PT: 10.9 sec;   INR: 0.94 ratio         PTT - ( 02 Mar 2023 20:05 )  PTT:29.9 sec  ABG - ( 02 Mar 2023 23:27 )  pH, Arterial: 7.38  pH, Blood: x     /  pCO2: 35    /  pO2: 247   / HCO3: 21    / Base Excess: -3.9  /  SaO2: 100.0                     MICROBIOLOGY:    No new microbiology data for review.     RADIOLOGY:  PACS Image: Image(s) Available (03-02-23 @ 21:17)    No new radiographic images for review.    MEDICATIONS  (STANDING):  acetaminophen   IVPB .. 1000 milliGRAM(s) IV Intermittent every 6 hours  enoxaparin Injectable 40 milliGRAM(s) SubCutaneous every 24 hours  HYDROmorphone PCA (1 mG/mL) 30 milliLiter(s) PCA Continuous PCA Continuous  lactated ringers. 1000 milliLiter(s) (125 mL/Hr) IV Continuous <Continuous>  magnesium sulfate  IVPB 2 Gram(s) IV Intermittent every 2 hours  piperacillin/tazobactam IVPB.. 3.375 Gram(s) IV Intermittent every 8 hours    MEDICATIONS  (PRN):  HYDROmorphone PCA (1 mG/mL) Rescue Clinician Bolus 0.5 milliGRAM(s) IV Push every 15 minutes PRN for Pain Scale GREATER THAN 6  nalbuphine Injectable 2.5 milliGRAM(s) IV Push every 6 hours PRN Pruritus  naloxone Injectable 0.1 milliGRAM(s) IV Push every 3 minutes PRN For ANY of the following changes in patient status:  A. RR LESS THAN 10 breaths per minute, B. Oxygen saturation LESS THAN 90%, C. Sedation score of 6  ondansetron Injectable 4 milliGRAM(s) IV Push every 6 hours PRN Nausea      Surgery [facility] A Team Daily Progress Note   MIRANDA NEAL | [id number]  --------------------------------------------------------------------------------------------------------------------  SUBJECTIVE / 24H EVENTS  Patient seen and examined on morning rounds. No acute events overnight.  --------------------------------------------------------------------------------------------------------------------  OBJECTIVE: Pt recovering well on the floors w/ NGT and Espino.    VITAL SIGNS:  T(C): 36.5 (03-03-23 @ 05:12), Max: 37.6 (03-02-23 @ 20:16)  HR: 86 (03-03-23 @ 05:12) (84 - 91)  BP: 100/65 (03-03-23 @ 05:12) (97/54 - 115/69)  RR: 17 (03-03-23 @ 05:12) (11 - 18)  SpO2: 95% (03-03-23 @ 05:12) (95% - 100%)  Daily     Daily       PHYSICAL EXAM:  Gen: NAD  LS: Respirations unlabored.   Card: RRR.   GI: Soft. LQ TTP. Minimally distended. NGT and Espino in place.  Ext: Warm, well perfused      03-02-23 @ 07:01  -  03-03-23 @ 07:00  --------------------------------------------------------  IN:    Lactated Ringers: 600 mL  Total IN: 600 mL    OUT:    Bulb (mL): 100 mL    Colostomy (mL): 0 mL    Indwelling Catheter - Urethral (mL): 400 mL    Oral Fluid: 0 mL    Voided (mL): 75 mL  Total OUT: 575 mL    Total NET: 25 mL          LAB VALUES:  03-03    134<L>  |  103  |  18  ----------------------------<  116<H>  3.9   |  22  |  0.47<L>    Ca    7.8<L>      03 Mar 2023 02:15  Phos  3.3     03-03  Mg     1.40     03-03    TPro  5.1<L>  /  Alb  3.3  /  TBili  0.6  /  DBili  x   /  AST  43<H>  /  ALT  46<H>  /  AlkPhos  53  03-03                               12.0   7.52  )-----------( 162      ( 03 Mar 2023 02:15 )             36.7     LIVER FUNCTIONS - ( 03 Mar 2023 02:15 )  Alb: 3.3 g/dL / Pro: 5.1 g/dL / ALK PHOS: 53 U/L / ALT: 46 U/L / AST: 43 U/L / GGT: x           PT/INR - ( 02 Mar 2023 20:05 )   PT: 10.9 sec;   INR: 0.94 ratio         PTT - ( 02 Mar 2023 20:05 )  PTT:29.9 sec  ABG - ( 02 Mar 2023 23:27 )  pH, Arterial: 7.38  pH, Blood: x     /  pCO2: 35    /  pO2: 247   / HCO3: 21    / Base Excess: -3.9  /  SaO2: 100.0                     MICROBIOLOGY:    No new microbiology data for review.     RADIOLOGY:  PACS Image: Image(s) Available (03-02-23 @ 21:17)    No new radiographic images for review.    MEDICATIONS  (STANDING):  acetaminophen   IVPB .. 1000 milliGRAM(s) IV Intermittent every 6 hours  enoxaparin Injectable 40 milliGRAM(s) SubCutaneous every 24 hours  HYDROmorphone PCA (1 mG/mL) 30 milliLiter(s) PCA Continuous PCA Continuous  lactated ringers. 1000 milliLiter(s) (125 mL/Hr) IV Continuous <Continuous>  magnesium sulfate  IVPB 2 Gram(s) IV Intermittent every 2 hours  piperacillin/tazobactam IVPB.. 3.375 Gram(s) IV Intermittent every 8 hours    MEDICATIONS  (PRN):  HYDROmorphone PCA (1 mG/mL) Rescue Clinician Bolus 0.5 milliGRAM(s) IV Push every 15 minutes PRN for Pain Scale GREATER THAN 6  nalbuphine Injectable 2.5 milliGRAM(s) IV Push every 6 hours PRN Pruritus  naloxone Injectable 0.1 milliGRAM(s) IV Push every 3 minutes PRN For ANY of the following changes in patient status:  A. RR LESS THAN 10 breaths per minute, B. Oxygen saturation LESS THAN 90%, C. Sedation score of 6  ondansetron Injectable 4 milliGRAM(s) IV Push every 6 hours PRN Nausea

## 2023-03-03 NOTE — PROGRESS NOTE ADULT - ASSESSMENT
57F hx of diverticulitis with 1 day of severe lower abdominal pain and outpatient CT suggestive of perforated diverticulitis s/p Ladonna 3/3    Plan:  - NPO  - LR 100cc  - Zosyn  - Tyl/PCA  - Espino  - serial ab exams  - Monitor NGT output  - f/u AM labs     A TEAM SURGERY  [id number]

## 2023-03-03 NOTE — ADVANCED PRACTICE NURSE CONSULT - ASSESSMENT
Patient AxOx4, ready and willing to learn. Patient states currently lethargic but will do best to stay awake. Overview of surgical procedure and need of ostomy reinforced. Terms defined utilized: Ostomy, colostomy, wafer, pouch. Frequency of pouch change and emptying discussed, teach back method utilized. Stool consistency with colostomy reviewed. Patient shown how to open, empty and close pouch. Removed colostomy pouch using pull and push technique, cleansed skin with water, patted dry. Emotional support and encouragement provided. Taught patient how to properly assess stoma viability and peristomal skin. Patient shown how to perform stoma measurement, creating template, cutting wafer, applying barrier ring (to protect peristomal skin from enzymatic irritation), and applying pouch. Reviewed s/s to report to MD. Importance of hydration reinforced. Educational brochure and pamphlets provided to patient.  Patient AxOx4, ready and willing to learn. Patient states currently lethargic but will do best to stay awake. Patient agreeable to passively participate in pouch change. Overview of surgical procedure and need of ostomy reinforced. Terms defined utilized: Ostomy, colostomy, wafer, pouch. Frequency of pouch change and emptying discussed, teach back method utilized. Stool consistency with colostomy reviewed. Patient shown how to open, empty and close pouch. Removed colostomy pouch using pull and push technique, cleansed skin with water, patted dry. Emotional support and encouragement provided. Taught patient how to properly assess stoma viability and peristomal skin. Patient shown how to perform stoma measurement, creating template, cutting wafer, applying barrier ring (to protect peristomal skin from enzymatic irritation), and applying pouch. Reviewed s/s to report to MD. Importance of hydration reinforced. Educational brochure and pamphlets provided to patient.     Stoma size: 1 1/2"(Top to bottom) x 1 3/4" (side to side). See A&I flowsheet for full assessment details.

## 2023-03-04 LAB
ANION GAP SERPL CALC-SCNC: 9 MMOL/L — SIGNIFICANT CHANGE UP (ref 7–14)
BUN SERPL-MCNC: 23 MG/DL — SIGNIFICANT CHANGE UP (ref 7–23)
CALCIUM SERPL-MCNC: 8.7 MG/DL — SIGNIFICANT CHANGE UP (ref 8.4–10.5)
CHLORIDE SERPL-SCNC: 103 MMOL/L — SIGNIFICANT CHANGE UP (ref 98–107)
CO2 SERPL-SCNC: 28 MMOL/L — SIGNIFICANT CHANGE UP (ref 22–31)
CREAT SERPL-MCNC: 0.72 MG/DL — SIGNIFICANT CHANGE UP (ref 0.5–1.3)
EGFR: 99 ML/MIN/1.73M2 — SIGNIFICANT CHANGE UP
GLUCOSE SERPL-MCNC: 79 MG/DL — SIGNIFICANT CHANGE UP (ref 70–99)
HCT VFR BLD CALC: 31.2 % — LOW (ref 34.5–45)
HGB BLD-MCNC: 10 G/DL — LOW (ref 11.5–15.5)
MAGNESIUM SERPL-MCNC: 2.4 MG/DL — SIGNIFICANT CHANGE UP (ref 1.6–2.6)
MCHC RBC-ENTMCNC: 31.5 PG — SIGNIFICANT CHANGE UP (ref 27–34)
MCHC RBC-ENTMCNC: 32.1 GM/DL — SIGNIFICANT CHANGE UP (ref 32–36)
MCV RBC AUTO: 98.4 FL — SIGNIFICANT CHANGE UP (ref 80–100)
NRBC # BLD: 0 /100 WBCS — SIGNIFICANT CHANGE UP (ref 0–0)
NRBC # FLD: 0 K/UL — SIGNIFICANT CHANGE UP (ref 0–0)
PHOSPHATE SERPL-MCNC: 2.4 MG/DL — LOW (ref 2.5–4.5)
PLATELET # BLD AUTO: 136 K/UL — LOW (ref 150–400)
POTASSIUM SERPL-MCNC: 3.6 MMOL/L — SIGNIFICANT CHANGE UP (ref 3.5–5.3)
POTASSIUM SERPL-SCNC: 3.6 MMOL/L — SIGNIFICANT CHANGE UP (ref 3.5–5.3)
RBC # BLD: 3.17 M/UL — LOW (ref 3.8–5.2)
RBC # FLD: 13.3 % — SIGNIFICANT CHANGE UP (ref 10.3–14.5)
SODIUM SERPL-SCNC: 140 MMOL/L — SIGNIFICANT CHANGE UP (ref 135–145)
WBC # BLD: 7.89 K/UL — SIGNIFICANT CHANGE UP (ref 3.8–10.5)
WBC # FLD AUTO: 7.89 K/UL — SIGNIFICANT CHANGE UP (ref 3.8–10.5)

## 2023-03-04 RX ORDER — POTASSIUM CHLORIDE 20 MEQ
10 PACKET (EA) ORAL
Refills: 0 | Status: COMPLETED | OUTPATIENT
Start: 2023-03-04 | End: 2023-03-04

## 2023-03-04 RX ORDER — POTASSIUM PHOSPHATE, MONOBASIC POTASSIUM PHOSPHATE, DIBASIC 236; 224 MG/ML; MG/ML
30 INJECTION, SOLUTION INTRAVENOUS ONCE
Refills: 0 | Status: COMPLETED | OUTPATIENT
Start: 2023-03-04 | End: 2023-03-04

## 2023-03-04 RX ORDER — SODIUM CHLORIDE 9 MG/ML
500 INJECTION, SOLUTION INTRAVENOUS ONCE
Refills: 0 | Status: COMPLETED | OUTPATIENT
Start: 2023-03-04 | End: 2023-03-04

## 2023-03-04 RX ORDER — ACETAMINOPHEN 500 MG
1000 TABLET ORAL EVERY 6 HOURS
Refills: 0 | Status: COMPLETED | OUTPATIENT
Start: 2023-03-04 | End: 2023-03-05

## 2023-03-04 RX ORDER — TETRACAINE/BENZOCAINE/BUTAMBEN 2%-14%-2%
1 OINTMENT (GRAM) TOPICAL ONCE
Refills: 0 | Status: COMPLETED | OUTPATIENT
Start: 2023-03-04 | End: 2023-03-04

## 2023-03-04 RX ADMIN — Medication 400 MILLIGRAM(S): at 18:24

## 2023-03-04 RX ADMIN — SODIUM CHLORIDE 125 MILLILITER(S): 9 INJECTION, SOLUTION INTRAVENOUS at 23:03

## 2023-03-04 RX ADMIN — Medication 1000 MILLIGRAM(S): at 18:39

## 2023-03-04 RX ADMIN — HYDROMORPHONE HYDROCHLORIDE 30 MILLILITER(S): 2 INJECTION INTRAMUSCULAR; INTRAVENOUS; SUBCUTANEOUS at 20:31

## 2023-03-04 RX ADMIN — HYDROMORPHONE HYDROCHLORIDE 30 MILLILITER(S): 2 INJECTION INTRAMUSCULAR; INTRAVENOUS; SUBCUTANEOUS at 08:26

## 2023-03-04 RX ADMIN — Medication 100 MILLIEQUIVALENT(S): at 12:49

## 2023-03-04 RX ADMIN — Medication 100 MILLIEQUIVALENT(S): at 09:59

## 2023-03-04 RX ADMIN — Medication 100 MILLIEQUIVALENT(S): at 08:52

## 2023-03-04 RX ADMIN — POTASSIUM PHOSPHATE, MONOBASIC POTASSIUM PHOSPHATE, DIBASIC 83.33 MILLIMOLE(S): 236; 224 INJECTION, SOLUTION INTRAVENOUS at 10:20

## 2023-03-04 RX ADMIN — ENOXAPARIN SODIUM 40 MILLIGRAM(S): 100 INJECTION SUBCUTANEOUS at 06:02

## 2023-03-04 RX ADMIN — PIPERACILLIN AND TAZOBACTAM 25 GRAM(S): 4; .5 INJECTION, POWDER, LYOPHILIZED, FOR SOLUTION INTRAVENOUS at 15:25

## 2023-03-04 RX ADMIN — PIPERACILLIN AND TAZOBACTAM 25 GRAM(S): 4; .5 INJECTION, POWDER, LYOPHILIZED, FOR SOLUTION INTRAVENOUS at 23:02

## 2023-03-04 RX ADMIN — Medication 1000 MILLIGRAM(S): at 13:04

## 2023-03-04 RX ADMIN — SODIUM CHLORIDE 1000 MILLILITER(S): 9 INJECTION, SOLUTION INTRAVENOUS at 08:34

## 2023-03-04 RX ADMIN — Medication 1000 MILLIGRAM(S): at 00:16

## 2023-03-04 RX ADMIN — Medication 400 MILLIGRAM(S): at 12:49

## 2023-03-04 RX ADMIN — SODIUM CHLORIDE 500 MILLILITER(S): 9 INJECTION, SOLUTION INTRAVENOUS at 16:49

## 2023-03-04 RX ADMIN — PIPERACILLIN AND TAZOBACTAM 25 GRAM(S): 4; .5 INJECTION, POWDER, LYOPHILIZED, FOR SOLUTION INTRAVENOUS at 06:02

## 2023-03-04 NOTE — PROGRESS NOTE ADULT - SUBJECTIVE AND OBJECTIVE BOX
Anesthesia Pain Management Service    SUBJECTIVE: Patient is doing well with IV PCA and no significant problems reported.  Patient was in 8/10 this morning, but after pressing the IV PCA demand button her pain came down to 6/10.    Pain Scale Score	At rest: _6/10__ 	With Activity: ___ 	[X ] Refer to charted pain scores    THERAPY:    [ ] IV PCA Morphine		[ ] 5 mg/mL	[ ] 1 mg/mL  [X ] IV PCA Hydromorphone	[ ] 5 mg/mL	[X ] 1 mg/mL  [ ] IV PCA Fentanyl		[ ] 50 micrograms/mL    Demand dose __0.2_ lockout __6_ (minutes) Continuous Rate _0__ Total: __4.4_  mg used (in past 24 hours)      MEDICATIONS  (STANDING):  acetaminophen   IVPB .. 1000 milliGRAM(s) IV Intermittent every 6 hours  enoxaparin Injectable 40 milliGRAM(s) SubCutaneous every 24 hours  HYDROmorphone PCA (1 mG/mL) 30 milliLiter(s) PCA Continuous PCA Continuous  lactated ringers. 1000 milliLiter(s) (125 mL/Hr) IV Continuous <Continuous>  piperacillin/tazobactam IVPB.. 3.375 Gram(s) IV Intermittent every 8 hours    MEDICATIONS  (PRN):  HYDROmorphone PCA (1 mG/mL) Rescue Clinician Bolus 0.5 milliGRAM(s) IV Push every 15 minutes PRN for Pain Scale GREATER THAN 6  nalbuphine Injectable 2.5 milliGRAM(s) IV Push every 6 hours PRN Pruritus  naloxone Injectable 0.1 milliGRAM(s) IV Push every 3 minutes PRN For ANY of the following changes in patient status:  A. RR LESS THAN 10 breaths per minute, B. Oxygen saturation LESS THAN 90%, C. Sedation score of 6  ondansetron Injectable 4 milliGRAM(s) IV Push every 6 hours PRN Nausea      OBJECTIVE:  Patient is sitting up in bed, NPO with NGT.     Sedation Score:	[ X] Alert	 [ ] Drowsy 	[ ] Arousable	[ ] Asleep	[ ] Unresponsive    Side Effects:	[X ] None	[ ] Nausea	[ ] Vomiting	[ ] Pruritus  		[ ] Other:    Vital Signs Last 24 Hrs  T(C): 37.1 (04 Mar 2023 10:00), Max: 37.1 (04 Mar 2023 10:00)  T(F): 98.7 (04 Mar 2023 10:00), Max: 98.7 (04 Mar 2023 10:00)  HR: 93 (04 Mar 2023 10:00) (83 - 93)  BP: 93/57 (04 Mar 2023 10:00) (91/56 - 98/61)  BP(mean): --  RR: 19 (04 Mar 2023 10:00) (18 - 19)  SpO2: 93% (04 Mar 2023 10:00) (93% - 98%)    Parameters below as of 04 Mar 2023 10:00  Patient On (Oxygen Delivery Method): room air        ASSESSMENT/ PLAN    Therapy to  be:	[ X] Continue   [ ] Discontinued   [ ] Change to prn Analgesics    Documentation and Verification of current medications:   [X] Done	[ ] Not done, not elligible    Comments: Will continue with IV Dilaudid PCA. Encouraged patient to press the IV PCA demand button when her pain is climbing to a 5/10, instead of waiting for 8/10.  This change may provide better pain control. Recommend non-opioid adjuvant analgesics to be used when possible and when allowed by primary surgical team.    Progress Note written now but Patient was seen earlier.

## 2023-03-04 NOTE — PROGRESS NOTE ADULT - ASSESSMENT
57F hx of diverticulitis with 1 day of severe lower abdominal pain and outpatient CT suggestive of perforated diverticulitis s/p Ladonna 3/3    Plan:  - NPO  - LR 100cc  - 500cc LR bolus iso high NGT losses  - Zosyn  - Tyl/PCA  - D/C Espino f/u TOV  - serial ab exams  - Monitor NGT output  - f/u AM labs     A TEAM SURGERY  [id number] 57F hx of diverticulitis with 1 day of severe lower abdominal pain and outpatient CT suggestive of perforated diverticulitis s/p Ladonna 3/3    Plan:  - NPO  - LR 100cc  - 500cc LR bolus iso high NGT losses  - Zosyn  - Tyl/PCA  - D/C Smith f/u TOV  - serial ab exams  - Monitor NGT output  - f/u AM labs     A TEAM SURGERY  [id number]    Pt seen and examined with residents  agree with above  fu on NGT output, if less than 400 then dc  NPO   IV abx  ok to dc smith  Francisco Lr MD FACS

## 2023-03-04 NOTE — PROGRESS NOTE ADULT - SUBJECTIVE AND OBJECTIVE BOX
Surgery [facility] A Team Daily Progress Note   MIRANDA NEAL | [id number]  --------------------------------------------------------------------------------------------------------------------  SUBJECTIVE / 24H EVENTS  Patient seen and examined on morning rounds. No acute events overnight.  --------------------------------------------------------------------------------------------------------------------  OBJECTIVE: Pt seen in AM and requesting d/c of NGT 2/2 to pain; tearful when reinforced the indications for continued NGT at this time. Otherwise pt's pain in tolerable. BM (-/-).    VITAL SIGNS:  T(C): 36.5 (03-04-23 @ 06:00), Max: 36.8 (03-03-23 @ 10:06)  HR: 86 (03-04-23 @ 06:00) (83 - 86)  BP: 98/61 (03-04-23 @ 06:00) (91/56 - 104/56)  RR: 18 (03-04-23 @ 06:00) (17 - 18)  SpO2: 93% (03-04-23 @ 06:00) (93% - 98%)  Daily Height in cm: 160.02 (03 Mar 2023 17:14)    Daily       PHYSICAL EXAM:  Gen: NAD  LS: Respirations unlabored.   Card: RRR.   GI: Soft. Minimal LQ TTP. Minimally distended. NGT and Espino in place.  Ext: Warm, well perfused        03-03-23 @ 07:01  -  03-04-23 @ 07:00  --------------------------------------------------------  IN:    IV PiggyBack: 250 mL    Lactated Ringers: 2750 mL    Oral Fluid: 160 mL  Total IN: 3160 mL    OUT:    Bulb (mL): 167.5 mL    Colostomy (mL): 0 mL    Indwelling Catheter - Urethral (mL): 875 mL    Nasogastric/Oral tube (mL): 1300 mL  Total OUT: 2342.5 mL    Total NET: 817.5 mL          LAB VALUES:  03-04    140  |  103  |  23  ----------------------------<  79  3.6   |  28  |  0.72    Ca    8.7      04 Mar 2023 07:10  Phos  2.4     03-04  Mg     2.40     03-04    TPro  5.1<L>  /  Alb  3.3  /  TBili  0.6  /  DBili  x   /  AST  43<H>  /  ALT  46<H>  /  AlkPhos  53  03-03                               10.0   7.89  )-----------( 136      ( 04 Mar 2023 07:10 )             31.2     LIVER FUNCTIONS - ( 03 Mar 2023 02:15 )  Alb: 3.3 g/dL / Pro: 5.1 g/dL / ALK PHOS: 53 U/L / ALT: 46 U/L / AST: 43 U/L / GGT: x           PT/INR - ( 02 Mar 2023 20:05 )   PT: 10.9 sec;   INR: 0.94 ratio         PTT - ( 02 Mar 2023 20:05 )  PTT:29.9 sec  ABG - ( 02 Mar 2023 23:27 )  pH, Arterial: 7.38  pH, Blood: x     /  pCO2: 35    /  pO2: 247   / HCO3: 21    / Base Excess: -3.9  /  SaO2: 100.0                     MICROBIOLOGY:    Culture - Body Fluid with Gram Stain (collected 02 Mar 2023 23:31)  Source: .Body Fluid PERITONEAL FLUID  Gram Stain (03 Mar 2023 12:28):    Few polymorphonuclear leukocytes seen    per oil power field    No organisms seen per oil power field      No new microbiology data for review.     RADIOLOGY:    No new radiographic images for review.    MEDICATIONS  (STANDING):  acetaminophen   IVPB .. 1000 milliGRAM(s) IV Intermittent every 6 hours  enoxaparin Injectable 40 milliGRAM(s) SubCutaneous every 24 hours  HYDROmorphone PCA (1 mG/mL) 30 milliLiter(s) PCA Continuous PCA Continuous  lactated ringers. 1000 milliLiter(s) (125 mL/Hr) IV Continuous <Continuous>  piperacillin/tazobactam IVPB.. 3.375 Gram(s) IV Intermittent every 8 hours  potassium chloride  10 mEq/100 mL IVPB 10 milliEquivalent(s) IV Intermittent every 1 hour  potassium phosphate IVPB 30 milliMole(s) IV Intermittent once    MEDICATIONS  (PRN):  HYDROmorphone PCA (1 mG/mL) Rescue Clinician Bolus 0.5 milliGRAM(s) IV Push every 15 minutes PRN for Pain Scale GREATER THAN 6  nalbuphine Injectable 2.5 milliGRAM(s) IV Push every 6 hours PRN Pruritus  naloxone Injectable 0.1 milliGRAM(s) IV Push every 3 minutes PRN For ANY of the following changes in patient status:  A. RR LESS THAN 10 breaths per minute, B. Oxygen saturation LESS THAN 90%, C. Sedation score of 6  ondansetron Injectable 4 milliGRAM(s) IV Push every 6 hours PRN Nausea

## 2023-03-05 LAB
-  AMIKACIN: SIGNIFICANT CHANGE UP
-  AMIKACIN: SIGNIFICANT CHANGE UP
-  AMOXICILLIN/CLAVULANIC ACID: SIGNIFICANT CHANGE UP
-  AMOXICILLIN/CLAVULANIC ACID: SIGNIFICANT CHANGE UP
-  AMPICILLIN/SULBACTAM: SIGNIFICANT CHANGE UP
-  AMPICILLIN/SULBACTAM: SIGNIFICANT CHANGE UP
-  AMPICILLIN: SIGNIFICANT CHANGE UP
-  AMPICILLIN: SIGNIFICANT CHANGE UP
-  AZTREONAM: SIGNIFICANT CHANGE UP
-  AZTREONAM: SIGNIFICANT CHANGE UP
-  CEFAZOLIN: SIGNIFICANT CHANGE UP
-  CEFAZOLIN: SIGNIFICANT CHANGE UP
-  CEFEPIME: SIGNIFICANT CHANGE UP
-  CEFEPIME: SIGNIFICANT CHANGE UP
-  CEFOXITIN: SIGNIFICANT CHANGE UP
-  CEFOXITIN: SIGNIFICANT CHANGE UP
-  CEFTRIAXONE: SIGNIFICANT CHANGE UP
-  CEFTRIAXONE: SIGNIFICANT CHANGE UP
-  CIPROFLOXACIN: SIGNIFICANT CHANGE UP
-  CIPROFLOXACIN: SIGNIFICANT CHANGE UP
-  ERTAPENEM: SIGNIFICANT CHANGE UP
-  ERTAPENEM: SIGNIFICANT CHANGE UP
-  GENTAMICIN: SIGNIFICANT CHANGE UP
-  GENTAMICIN: SIGNIFICANT CHANGE UP
-  IMIPENEM: SIGNIFICANT CHANGE UP
-  LEVOFLOXACIN: SIGNIFICANT CHANGE UP
-  LEVOFLOXACIN: SIGNIFICANT CHANGE UP
-  MEROPENEM: SIGNIFICANT CHANGE UP
-  MEROPENEM: SIGNIFICANT CHANGE UP
-  PIPERACILLIN/TAZOBACTAM: SIGNIFICANT CHANGE UP
-  PIPERACILLIN/TAZOBACTAM: SIGNIFICANT CHANGE UP
-  TOBRAMYCIN: SIGNIFICANT CHANGE UP
-  TOBRAMYCIN: SIGNIFICANT CHANGE UP
-  TRIMETHOPRIM/SULFAMETHOXAZOLE: SIGNIFICANT CHANGE UP
-  TRIMETHOPRIM/SULFAMETHOXAZOLE: SIGNIFICANT CHANGE UP
ANION GAP SERPL CALC-SCNC: 11 MMOL/L — SIGNIFICANT CHANGE UP (ref 7–14)
BUN SERPL-MCNC: 16 MG/DL — SIGNIFICANT CHANGE UP (ref 7–23)
CALCIUM SERPL-MCNC: 8.8 MG/DL — SIGNIFICANT CHANGE UP (ref 8.4–10.5)
CHLORIDE SERPL-SCNC: 101 MMOL/L — SIGNIFICANT CHANGE UP (ref 98–107)
CO2 SERPL-SCNC: 24 MMOL/L — SIGNIFICANT CHANGE UP (ref 22–31)
CREAT SERPL-MCNC: 0.55 MG/DL — SIGNIFICANT CHANGE UP (ref 0.5–1.3)
EGFR: 108 ML/MIN/1.73M2 — SIGNIFICANT CHANGE UP
GLUCOSE SERPL-MCNC: 64 MG/DL — LOW (ref 70–99)
HCT VFR BLD CALC: 31 % — LOW (ref 34.5–45)
HGB BLD-MCNC: 9.6 G/DL — LOW (ref 11.5–15.5)
MAGNESIUM SERPL-MCNC: 1.9 MG/DL — SIGNIFICANT CHANGE UP (ref 1.6–2.6)
MCHC RBC-ENTMCNC: 30.8 PG — SIGNIFICANT CHANGE UP (ref 27–34)
MCHC RBC-ENTMCNC: 31 GM/DL — LOW (ref 32–36)
MCV RBC AUTO: 99.4 FL — SIGNIFICANT CHANGE UP (ref 80–100)
METHOD TYPE: SIGNIFICANT CHANGE UP
METHOD TYPE: SIGNIFICANT CHANGE UP
NRBC # BLD: 0 /100 WBCS — SIGNIFICANT CHANGE UP (ref 0–0)
NRBC # FLD: 0 K/UL — SIGNIFICANT CHANGE UP (ref 0–0)
PHOSPHATE SERPL-MCNC: 3.3 MG/DL — SIGNIFICANT CHANGE UP (ref 2.5–4.5)
PLATELET # BLD AUTO: 130 K/UL — LOW (ref 150–400)
POTASSIUM SERPL-MCNC: 3.3 MMOL/L — LOW (ref 3.5–5.3)
POTASSIUM SERPL-SCNC: 3.3 MMOL/L — LOW (ref 3.5–5.3)
RBC # BLD: 3.12 M/UL — LOW (ref 3.8–5.2)
RBC # FLD: 13.2 % — SIGNIFICANT CHANGE UP (ref 10.3–14.5)
SODIUM SERPL-SCNC: 136 MMOL/L — SIGNIFICANT CHANGE UP (ref 135–145)
WBC # BLD: 7.09 K/UL — SIGNIFICANT CHANGE UP (ref 3.8–10.5)
WBC # FLD AUTO: 7.09 K/UL — SIGNIFICANT CHANGE UP (ref 3.8–10.5)

## 2023-03-05 RX ORDER — POTASSIUM CHLORIDE 20 MEQ
10 PACKET (EA) ORAL
Refills: 0 | Status: COMPLETED | OUTPATIENT
Start: 2023-03-05 | End: 2023-03-05

## 2023-03-05 RX ORDER — ACETAMINOPHEN 500 MG
1000 TABLET ORAL EVERY 6 HOURS
Refills: 0 | Status: COMPLETED | OUTPATIENT
Start: 2023-03-05 | End: 2023-03-06

## 2023-03-05 RX ORDER — DEXTROSE MONOHYDRATE, SODIUM CHLORIDE, AND POTASSIUM CHLORIDE 50; .745; 4.5 G/1000ML; G/1000ML; G/1000ML
1000 INJECTION, SOLUTION INTRAVENOUS
Refills: 0 | Status: DISCONTINUED | OUTPATIENT
Start: 2023-03-05 | End: 2023-03-07

## 2023-03-05 RX ADMIN — HYDROMORPHONE HYDROCHLORIDE 30 MILLILITER(S): 2 INJECTION INTRAMUSCULAR; INTRAVENOUS; SUBCUTANEOUS at 07:16

## 2023-03-05 RX ADMIN — PIPERACILLIN AND TAZOBACTAM 25 GRAM(S): 4; .5 INJECTION, POWDER, LYOPHILIZED, FOR SOLUTION INTRAVENOUS at 22:57

## 2023-03-05 RX ADMIN — Medication 100 MILLIEQUIVALENT(S): at 09:27

## 2023-03-05 RX ADMIN — SODIUM CHLORIDE 125 MILLILITER(S): 9 INJECTION, SOLUTION INTRAVENOUS at 07:03

## 2023-03-05 RX ADMIN — Medication 1000 MILLIGRAM(S): at 13:04

## 2023-03-05 RX ADMIN — Medication 400 MILLIGRAM(S): at 12:44

## 2023-03-05 RX ADMIN — HYDROMORPHONE HYDROCHLORIDE 30 MILLILITER(S): 2 INJECTION INTRAMUSCULAR; INTRAVENOUS; SUBCUTANEOUS at 07:23

## 2023-03-05 RX ADMIN — ENOXAPARIN SODIUM 40 MILLIGRAM(S): 100 INJECTION SUBCUTANEOUS at 05:19

## 2023-03-05 RX ADMIN — Medication 400 MILLIGRAM(S): at 18:39

## 2023-03-05 RX ADMIN — DEXTROSE MONOHYDRATE, SODIUM CHLORIDE, AND POTASSIUM CHLORIDE 125 MILLILITER(S): 50; .745; 4.5 INJECTION, SOLUTION INTRAVENOUS at 22:07

## 2023-03-05 RX ADMIN — Medication 100 MILLIEQUIVALENT(S): at 12:45

## 2023-03-05 RX ADMIN — PIPERACILLIN AND TAZOBACTAM 25 GRAM(S): 4; .5 INJECTION, POWDER, LYOPHILIZED, FOR SOLUTION INTRAVENOUS at 15:24

## 2023-03-05 RX ADMIN — HYDROMORPHONE HYDROCHLORIDE 30 MILLILITER(S): 2 INJECTION INTRAMUSCULAR; INTRAVENOUS; SUBCUTANEOUS at 19:39

## 2023-03-05 RX ADMIN — Medication 400 MILLIGRAM(S): at 06:06

## 2023-03-05 RX ADMIN — Medication 1000 MILLIGRAM(S): at 18:47

## 2023-03-05 RX ADMIN — DEXTROSE MONOHYDRATE, SODIUM CHLORIDE, AND POTASSIUM CHLORIDE 125 MILLILITER(S): 50; .745; 4.5 INJECTION, SOLUTION INTRAVENOUS at 09:27

## 2023-03-05 RX ADMIN — Medication 100 MILLIEQUIVALENT(S): at 15:24

## 2023-03-05 RX ADMIN — PIPERACILLIN AND TAZOBACTAM 25 GRAM(S): 4; .5 INJECTION, POWDER, LYOPHILIZED, FOR SOLUTION INTRAVENOUS at 07:03

## 2023-03-05 RX ADMIN — Medication 400 MILLIGRAM(S): at 00:03

## 2023-03-05 NOTE — PROGRESS NOTE ADULT - SUBJECTIVE AND OBJECTIVE BOX
Anesthesia Pain Management Service    SUBJECTIVE: Patient is doing well with IV PCA and no significant problems reported.    Pain Scale Score	At rest: ___ 	With Activity: ___ 	[X ] Refer to charted pain scores    THERAPY:    [ ] IV PCA Morphine		[ ] 5 mg/mL	[ ] 1 mg/mL  [X ] IV PCA Hydromorphone	[ ] 5 mg/mL	[X ] 1 mg/mL  [ ] IV PCA Fentanyl		[ ] 50 micrograms/mL    Demand dose __0.2_ lockout __6_ (minutes) Continuous Rate _0__ Total: _4.4__  mg used (in past 24 hours)      MEDICATIONS  (STANDING):  acetaminophen   IVPB .. 1000 milliGRAM(s) IV Intermittent every 6 hours  dextrose 5% + sodium chloride 0.45% with potassium chloride 20 mEq/L 1000 milliLiter(s) (125 mL/Hr) IV Continuous <Continuous>  enoxaparin Injectable 40 milliGRAM(s) SubCutaneous every 24 hours  HYDROmorphone PCA (1 mG/mL) 30 milliLiter(s) PCA Continuous PCA Continuous  piperacillin/tazobactam IVPB.. 3.375 Gram(s) IV Intermittent every 8 hours  potassium chloride  10 mEq/100 mL IVPB 10 milliEquivalent(s) IV Intermittent every 1 hour    MEDICATIONS  (PRN):  HYDROmorphone PCA (1 mG/mL) Rescue Clinician Bolus 0.5 milliGRAM(s) IV Push every 15 minutes PRN for Pain Scale GREATER THAN 6  nalbuphine Injectable 2.5 milliGRAM(s) IV Push every 6 hours PRN Pruritus  naloxone Injectable 0.1 milliGRAM(s) IV Push every 3 minutes PRN For ANY of the following changes in patient status:  A. RR LESS THAN 10 breaths per minute, B. Oxygen saturation LESS THAN 90%, C. Sedation score of 6  ondansetron Injectable 4 milliGRAM(s) IV Push every 6 hours PRN Nausea      OBJECTIVE: Patient sitting up in bed.    Sedation Score:	[ X] Alert	[ ] Drowsy 	[ ] Arousable	[ ] Asleep	[ ] Unresponsive    Side Effects:	[X ] None	[ ] Nausea	[ ] Vomiting	[ ] Pruritus  		[ ] Other:    Vital Signs Last 24 Hrs  T(C): 36.4 (05 Mar 2023 05:18), Max: 37.1 (04 Mar 2023 18:00)  T(F): 97.5 (05 Mar 2023 05:18), Max: 98.7 (04 Mar 2023 18:00)  HR: 83 (05 Mar 2023 05:18) (83 - 97)  BP: 120/65 (05 Mar 2023 05:18) (103/58 - 122/62)  BP(mean): --  RR: 16 (05 Mar 2023 05:18) (16 - 20)  SpO2: 96% (05 Mar 2023 05:18) (95% - 97%)    Parameters below as of 05 Mar 2023 05:18  Patient On (Oxygen Delivery Method): room air        ASSESSMENT/ PLAN    Therapy to  be:	[ X] Continue   [ ] Discontinued   [ ] Change to prn Analgesics    Documentation and Verification of current medications:   [X] Done	[ ] Not done, not elligible    Comments: Continue IV PCA. Recommend non-opioid adjuvant analgesics to be used when possible and when allowed by primary surgical team.    Progress Note written now but Patient was seen earlier.

## 2023-03-05 NOTE — PROGRESS NOTE ADULT - ASSESSMENT
57F hx of diverticulitis with 1 day of severe lower abdominal pain and outpatient CT suggestive of perforated diverticulitis s/p Ladonna 3/3    Plan:  - NPO  - D5+1/2NS w/ K 125cc  - Zosyn  - Tyl/PCA  - serial ab exams  - f/u AM labs   - Lovenox    A TEAM SURGERY  [id number]   57F hx of diverticulitis with 1 day of severe lower abdominal pain and outpatient CT suggestive of perforated diverticulitis s/p Ladonna 3/3    Plan:  - NPO  - D/C NGT  - D5+1/2NS w/ K 125cc  - Zosyn  - Tyl/PCA  - serial ab exams  - f/u AM labs   - Lovenox    A TEAM SURGERY  [id number]   57F hx of diverticulitis with 1 day of severe lower abdominal pain and outpatient CT suggestive of perforated diverticulitis s/p Ladonna 3/3    Plan:  - NPO  - D/C NGT  - D5+1/2NS w/ K 125cc  - Zosyn  - Tyl/PCA  - serial ab exams  - f/u AM labs   - Lovenox    A TEAM SURGERY  [id number]    Pt seen and examined  agree with above   sips clears  con abx    Francisco Lr MD

## 2023-03-05 NOTE — PROGRESS NOTE ADULT - SUBJECTIVE AND OBJECTIVE BOX
Surgery [facility] A Team Daily Progress Note   MIRANDA NEAL | MRN-[id number]  --------------------------------------------------------------------------------------------------------------------  SUBJECTIVE / 24H EVENTS  Patient seen and examined on morning rounds. No acute events overnight.  --------------------------------------------------------------------------------------------------------------------  OBJECTIVE: Pt is feeling well, has been ambulating, denies n/v.    VITAL SIGNS:  T(C): 36.4 (03-05-23 @ 05:18), Max: 37.1 (03-04-23 @ 10:00)  HR: 83 (03-05-23 @ 05:18) (83 - 97)  BP: 120/65 (03-05-23 @ 05:18) (93/57 - 122/62)  RR: 16 (03-05-23 @ 05:18) (16 - 20)  SpO2: 96% (03-05-23 @ 05:18) (93% - 97%)  Daily     Daily       PHYSICAL EXAM:  Gen: NAD  LS: Respirations unlabored.   Card: RRR.   GI: Soft. Nontender. Nondistended. NGT in place. Ostomy viable w/ minimal gas.  Ext: Warm, well perfused      03-04-23 @ 07:01  -  03-05-23 @ 07:00  --------------------------------------------------------  IN:    IV PiggyBack: 300 mL    Lactated Ringers: 1500 mL  Total IN: 1800 mL    OUT:    Bulb (mL): 80 mL    Colostomy (mL): 15 mL    Indwelling Catheter - Urethral (mL): 100 mL    Nasogastric/Oral tube (mL): 750 mL    Oral Fluid: 0 mL    Voided (mL): 1100 mL  Total OUT: 2045 mL    Total NET: -245 mL          LAB VALUES:  03-05    136  |  101  |  16  ----------------------------<  64<L>  3.3<L>   |  24  |  0.55    Ca    8.8      05 Mar 2023 05:10  Phos  2.4     03-04  Mg     1.90     03-05                                 9.6    7.09  )-----------( 130      ( 05 Mar 2023 05:10 )             31.0                   MICROBIOLOGY:    Culture - Fungal, Body Fluid (collected 02 Mar 2023 23:31)  Source: .Body Fluid PERITONEAL FLUID  Preliminary Report (04 Mar 2023 15:03):    Culture is being performed. Fungal cultures are held for 4 weeks.    Culture - Body Fluid with Gram Stain (collected 02 Mar 2023 23:31)  Source: .Body Fluid PERITONEAL FLUID  Gram Stain (03 Mar 2023 12:28):    Few polymorphonuclear leukocytes seen    per oil power field    No organisms seen per oil power field  Preliminary Report (04 Mar 2023 13:30):    Few Morganella morganii    Rare Escherichia coli      No new microbiology data for review.     RADIOLOGY:    No new radiographic images for review.    MEDICATIONS  (STANDING):  acetaminophen   IVPB .. 1000 milliGRAM(s) IV Intermittent every 6 hours  dextrose 5% + sodium chloride 0.45% with potassium chloride 20 mEq/L 1000 milliLiter(s) (125 mL/Hr) IV Continuous <Continuous>  enoxaparin Injectable 40 milliGRAM(s) SubCutaneous every 24 hours  HYDROmorphone PCA (1 mG/mL) 30 milliLiter(s) PCA Continuous PCA Continuous  piperacillin/tazobactam IVPB.. 3.375 Gram(s) IV Intermittent every 8 hours  potassium chloride  10 mEq/100 mL IVPB 10 milliEquivalent(s) IV Intermittent every 1 hour    MEDICATIONS  (PRN):  HYDROmorphone PCA (1 mG/mL) Rescue Clinician Bolus 0.5 milliGRAM(s) IV Push every 15 minutes PRN for Pain Scale GREATER THAN 6  nalbuphine Injectable 2.5 milliGRAM(s) IV Push every 6 hours PRN Pruritus  naloxone Injectable 0.1 milliGRAM(s) IV Push every 3 minutes PRN For ANY of the following changes in patient status:  A. RR LESS THAN 10 breaths per minute, B. Oxygen saturation LESS THAN 90%, C. Sedation score of 6  ondansetron Injectable 4 milliGRAM(s) IV Push every 6 hours PRN Nausea

## 2023-03-06 ENCOUNTER — TRANSCRIPTION ENCOUNTER (OUTPATIENT)
Age: 58
End: 2023-03-06

## 2023-03-06 LAB
ANION GAP SERPL CALC-SCNC: 9 MMOL/L — SIGNIFICANT CHANGE UP (ref 7–14)
BUN SERPL-MCNC: 5 MG/DL — LOW (ref 7–23)
CALCIUM SERPL-MCNC: 9 MG/DL — SIGNIFICANT CHANGE UP (ref 8.4–10.5)
CHLORIDE SERPL-SCNC: 103 MMOL/L — SIGNIFICANT CHANGE UP (ref 98–107)
CO2 SERPL-SCNC: 26 MMOL/L — SIGNIFICANT CHANGE UP (ref 22–31)
CREAT SERPL-MCNC: 0.46 MG/DL — LOW (ref 0.5–1.3)
EGFR: 113 ML/MIN/1.73M2 — SIGNIFICANT CHANGE UP
GLUCOSE SERPL-MCNC: 148 MG/DL — HIGH (ref 70–99)
HCT VFR BLD CALC: 32.3 % — LOW (ref 34.5–45)
HGB BLD-MCNC: 10.3 G/DL — LOW (ref 11.5–15.5)
MAGNESIUM SERPL-MCNC: 1.8 MG/DL — SIGNIFICANT CHANGE UP (ref 1.6–2.6)
MCHC RBC-ENTMCNC: 30.8 PG — SIGNIFICANT CHANGE UP (ref 27–34)
MCHC RBC-ENTMCNC: 31.9 GM/DL — LOW (ref 32–36)
MCV RBC AUTO: 96.7 FL — SIGNIFICANT CHANGE UP (ref 80–100)
NRBC # BLD: 0 /100 WBCS — SIGNIFICANT CHANGE UP (ref 0–0)
NRBC # FLD: 0 K/UL — SIGNIFICANT CHANGE UP (ref 0–0)
PHOSPHATE SERPL-MCNC: 3.9 MG/DL — SIGNIFICANT CHANGE UP (ref 2.5–4.5)
PLATELET # BLD AUTO: 169 K/UL — SIGNIFICANT CHANGE UP (ref 150–400)
POTASSIUM SERPL-MCNC: 3.6 MMOL/L — SIGNIFICANT CHANGE UP (ref 3.5–5.3)
POTASSIUM SERPL-SCNC: 3.6 MMOL/L — SIGNIFICANT CHANGE UP (ref 3.5–5.3)
RBC # BLD: 3.34 M/UL — LOW (ref 3.8–5.2)
RBC # FLD: 12.5 % — SIGNIFICANT CHANGE UP (ref 10.3–14.5)
SODIUM SERPL-SCNC: 138 MMOL/L — SIGNIFICANT CHANGE UP (ref 135–145)
WBC # BLD: 5.43 K/UL — SIGNIFICANT CHANGE UP (ref 3.8–10.5)
WBC # FLD AUTO: 5.43 K/UL — SIGNIFICANT CHANGE UP (ref 3.8–10.5)

## 2023-03-06 RX ORDER — ACETAMINOPHEN 500 MG
975 TABLET ORAL EVERY 6 HOURS
Refills: 0 | Status: DISCONTINUED | OUTPATIENT
Start: 2023-03-06 | End: 2023-03-10

## 2023-03-06 RX ORDER — ACETAMINOPHEN 500 MG
1000 TABLET ORAL EVERY 6 HOURS
Refills: 0 | Status: DISCONTINUED | OUTPATIENT
Start: 2023-03-06 | End: 2023-03-06

## 2023-03-06 RX ORDER — HYDROMORPHONE HYDROCHLORIDE 2 MG/ML
0.5 INJECTION INTRAMUSCULAR; INTRAVENOUS; SUBCUTANEOUS ONCE
Refills: 0 | Status: DISCONTINUED | OUTPATIENT
Start: 2023-03-06 | End: 2023-03-06

## 2023-03-06 RX ORDER — OXYCODONE HYDROCHLORIDE 5 MG/1
5 TABLET ORAL EVERY 6 HOURS
Refills: 0 | Status: DISCONTINUED | OUTPATIENT
Start: 2023-03-06 | End: 2023-03-06

## 2023-03-06 RX ORDER — OXYCODONE HYDROCHLORIDE 5 MG/1
10 TABLET ORAL EVERY 4 HOURS
Refills: 0 | Status: DISCONTINUED | OUTPATIENT
Start: 2023-03-06 | End: 2023-03-10

## 2023-03-06 RX ORDER — OXYCODONE HYDROCHLORIDE 5 MG/1
5 TABLET ORAL EVERY 4 HOURS
Refills: 0 | Status: DISCONTINUED | OUTPATIENT
Start: 2023-03-06 | End: 2023-03-10

## 2023-03-06 RX ORDER — OXYCODONE HYDROCHLORIDE 5 MG/1
2.5 TABLET ORAL EVERY 6 HOURS
Refills: 0 | Status: DISCONTINUED | OUTPATIENT
Start: 2023-03-06 | End: 2023-03-06

## 2023-03-06 RX ADMIN — Medication 975 MILLIGRAM(S): at 11:20

## 2023-03-06 RX ADMIN — OXYCODONE HYDROCHLORIDE 10 MILLIGRAM(S): 5 TABLET ORAL at 18:20

## 2023-03-06 RX ADMIN — OXYCODONE HYDROCHLORIDE 10 MILLIGRAM(S): 5 TABLET ORAL at 17:34

## 2023-03-06 RX ADMIN — Medication 400 MILLIGRAM(S): at 05:23

## 2023-03-06 RX ADMIN — PIPERACILLIN AND TAZOBACTAM 25 GRAM(S): 4; .5 INJECTION, POWDER, LYOPHILIZED, FOR SOLUTION INTRAVENOUS at 22:15

## 2023-03-06 RX ADMIN — DEXTROSE MONOHYDRATE, SODIUM CHLORIDE, AND POTASSIUM CHLORIDE 125 MILLILITER(S): 50; .745; 4.5 INJECTION, SOLUTION INTRAVENOUS at 09:59

## 2023-03-06 RX ADMIN — Medication 975 MILLIGRAM(S): at 22:22

## 2023-03-06 RX ADMIN — Medication 975 MILLIGRAM(S): at 17:34

## 2023-03-06 RX ADMIN — Medication 975 MILLIGRAM(S): at 22:52

## 2023-03-06 RX ADMIN — Medication 975 MILLIGRAM(S): at 18:20

## 2023-03-06 RX ADMIN — PIPERACILLIN AND TAZOBACTAM 25 GRAM(S): 4; .5 INJECTION, POWDER, LYOPHILIZED, FOR SOLUTION INTRAVENOUS at 06:36

## 2023-03-06 RX ADMIN — Medication 400 MILLIGRAM(S): at 06:33

## 2023-03-06 RX ADMIN — OXYCODONE HYDROCHLORIDE 10 MILLIGRAM(S): 5 TABLET ORAL at 22:45

## 2023-03-06 RX ADMIN — OXYCODONE HYDROCHLORIDE 5 MILLIGRAM(S): 5 TABLET ORAL at 09:59

## 2023-03-06 RX ADMIN — HYDROMORPHONE HYDROCHLORIDE 0.5 MILLIGRAM(S): 2 INJECTION INTRAMUSCULAR; INTRAVENOUS; SUBCUTANEOUS at 13:10

## 2023-03-06 RX ADMIN — PIPERACILLIN AND TAZOBACTAM 25 GRAM(S): 4; .5 INJECTION, POWDER, LYOPHILIZED, FOR SOLUTION INTRAVENOUS at 15:00

## 2023-03-06 RX ADMIN — OXYCODONE HYDROCHLORIDE 5 MILLIGRAM(S): 5 TABLET ORAL at 10:59

## 2023-03-06 RX ADMIN — ENOXAPARIN SODIUM 40 MILLIGRAM(S): 100 INJECTION SUBCUTANEOUS at 05:23

## 2023-03-06 RX ADMIN — Medication 975 MILLIGRAM(S): at 12:20

## 2023-03-06 RX ADMIN — OXYCODONE HYDROCHLORIDE 10 MILLIGRAM(S): 5 TABLET ORAL at 22:14

## 2023-03-06 RX ADMIN — DEXTROSE MONOHYDRATE, SODIUM CHLORIDE, AND POTASSIUM CHLORIDE 75 MILLILITER(S): 50; .745; 4.5 INJECTION, SOLUTION INTRAVENOUS at 16:31

## 2023-03-06 RX ADMIN — HYDROMORPHONE HYDROCHLORIDE 0.5 MILLIGRAM(S): 2 INJECTION INTRAMUSCULAR; INTRAVENOUS; SUBCUTANEOUS at 12:50

## 2023-03-06 NOTE — DISCHARGE NOTE PROVIDER - NSDCCPCAREPLAN_GEN_ALL_CORE_FT
PRINCIPAL DISCHARGE DIAGNOSIS  Diagnosis: Perforated sigmoid colon  Assessment and Plan of Treatment:        PRINCIPAL DISCHARGE DIAGNOSIS  Diagnosis: Perforated sigmoid colon  Assessment and Plan of Treatment: perforated diverticulitis s/p Ladonna  WOUND CARE:  Please keep incisions clean and dry. Please do not Scrub or rub incisions. DO NOT use lotion or powder on incisions.   BATHING: You may shower and/or sponge bathe. You may use warm soapy water in the shower and rinse, pat dry. NO baths no pools for 6 weeks.  ACTIVITY: No heavy lifting or straining. Otherwise, you may return to your usual level of physical activity.   Take Tylenol 500mg every 6 hours as needed for mild to moderate pain if your pain continues take oxycodone 5 mg. If you are taking narcotic pain (oxycodone) medication DO NOT drive a car, operate machinery or make important decisions.  DIET: Return to your usual diet.  NOTIFY YOUR SURGEON IF YOU HAVE: any bleeding that does not stop, any pus draining from your wound(s), any fever (over 100.4 F) persistent nausea/vomiting, or if your pain is not controlled on your discharge pain medications, unable to urinate.  Please follow up with your primary care physician in one week regarding your hospitalization, bring copies of your discharge paperwork.  Please follow up with your surgeon  OstomySupplies utilized:   Liquid barrier film    Skin barrier ring- item # [id number]  One piece drainable pouch- item #[id number]         PRINCIPAL DISCHARGE DIAGNOSIS  Diagnosis: Perforated sigmoid colon  Assessment and Plan of Treatment: perforated diverticulitis s/p Ladonna  WOUND CARE:  Please keep incisions clean and dry. Please do not Scrub or rub incisions. DO NOT use lotion or powder on incisions.   You will be discharged with YONATHAN drains. You will need to empty them and record outputs accurately. This will be taught to you by the nursing staff. Please do not remove the YONATHAN drains. They will be removed in the office. Please bring to the office accurate records of output.   BATHING: You may shower and/or sponge bathe. You may use warm soapy water in the shower and rinse, pat dry. NO baths no pools for 6 weeks.  ACTIVITY: No heavy lifting or straining. Otherwise, you may return to your usual level of physical activity.   Take Tylenol 500mg every 6 hours as needed for mild to moderate pain if your pain continues take oxycodone 5 mg. If you are taking narcotic pain (oxycodone) medication DO NOT drive a car, operate machinery or make important decisions.  DIET: Return to your usual diet.  NOTIFY YOUR SURGEON IF YOU HAVE: any bleeding that does not stop, any pus draining from your wound(s), any fever (over 100.4 F) persistent nausea/vomiting, or if your pain is not controlled on your discharge pain medications, unable to urinate.  Please follow up with your primary care physician in one week regarding your hospitalization, bring copies of your discharge paperwork.  Please follow up with your surgeon  OstomySupplies utilized:   Liquid barrier film    Skin barrier ring- item # [id number]  One piece drainable pouch- item #[id number]

## 2023-03-06 NOTE — ADVANCED PRACTICE NURSE CONSULT - ASSESSMENT
Patient AxOx4, ready and willing to learn. Patient agreeable to participate in pouch change. Overview of surgical procedure and need of ostomy reinforced. Terms defined utilized: Ostomy, colostomy, wafer, pouch. Frequency of pouch change and emptying discussed, teach back method utilized. Stool consistency with colostomy reviewed. Patient shown how to open, empty and close pouch and able to demonstrate with teach back method. Removed colostomy pouch using pull and push technique, cleansed skin with water, patted dry. Emotional support and encouragement provided. Taught patient how to properly assess stoma viability and peristomal skin. Patient shown and participated with how to perform stoma measurement, creating template, cutting wafer, applying barrier ring (to protect peristomal skin from enzymatic irritation), and applying pouch. Reviewed s/s to report to MD. Importance of hydration reinforced. Educational brochure and pamphlets were provided to patient.      Stoma size: 1 1/2"(Top to bottom) x 2" (side to side). See A&I flowsheet for full assessment details.

## 2023-03-06 NOTE — PROGRESS NOTE ADULT - ASSESSMENT
57F hx of diverticulitis with 1 day of severe lower abdominal pain and outpatient CT suggestive of perforated diverticulitis s/p Ladonna 3/3    Plan:  - NPO/IVF  - Zosyn  - Tyl/PCA  - serial ab exams  - DVT ppx     A TEAM SURGERY  [id number]

## 2023-03-06 NOTE — PROGRESS NOTE ADULT - SUBJECTIVE AND OBJECTIVE BOX
SURGERY DAILY PROGRESS NOTE:     SUBJECTIVE/ROS: Patient seen at bedside this AM. Doing well, pain improved, denies n/v. Afebrile     24h Events:   - Overnight, no acute events    OBJECTIVE:  Vital Signs Last 24 Hrs  T(C): 36.8 (06 Mar 2023 05:21), Max: 37.2 (05 Mar 2023 15:00)  T(F): 98.3 (06 Mar 2023 05:21), Max: 98.9 (05 Mar 2023 15:00)  HR: 72 (06 Mar 2023 05:21) (72 - 88)  BP: 115/69 (06 Mar 2023 05:21) (108/61 - 128/70)  BP(mean): --  RR: 18 (06 Mar 2023 05:21) (16 - 18)  SpO2: 98% (06 Mar 2023 05:21) (95% - 100%)    Parameters below as of 06 Mar 2023 05:21  Patient On (Oxygen Delivery Method): room air      I&O's Detail    05 Mar 2023 07:01  -  06 Mar 2023 07:00  --------------------------------------------------------  IN:    dextrose 5% + sodium chloride 0.45% w/ Additives: 1375 mL  Total IN: 1375 mL    OUT:    Bulb (mL): 70 mL    Colostomy (mL): 0 mL    Nasogastric/Oral tube (mL): 200 mL    Oral Fluid: 0 mL    Voided (mL): 2050 mL  Total OUT: 2320 mL    Total NET: -945 mL        Daily     Daily   MEDICATIONS  (STANDING):  acetaminophen   IVPB .. 1000 milliGRAM(s) IV Intermittent every 6 hours  dextrose 5% + sodium chloride 0.45% with potassium chloride 20 mEq/L 1000 milliLiter(s) (125 mL/Hr) IV Continuous <Continuous>  enoxaparin Injectable 40 milliGRAM(s) SubCutaneous every 24 hours  HYDROmorphone PCA (1 mG/mL) 30 milliLiter(s) PCA Continuous PCA Continuous  piperacillin/tazobactam IVPB.. 3.375 Gram(s) IV Intermittent every 8 hours    MEDICATIONS  (PRN):  HYDROmorphone PCA (1 mG/mL) Rescue Clinician Bolus 0.5 milliGRAM(s) IV Push every 15 minutes PRN for Pain Scale GREATER THAN 6  nalbuphine Injectable 2.5 milliGRAM(s) IV Push every 6 hours PRN Pruritus  naloxone Injectable 0.1 milliGRAM(s) IV Push every 3 minutes PRN For ANY of the following changes in patient status:  A. RR LESS THAN 10 breaths per minute, B. Oxygen saturation LESS THAN 90%, C. Sedation score of 6  ondansetron Injectable 4 milliGRAM(s) IV Push every 6 hours PRN Nausea      LABS:                        9.6    7.09  )-----------( 130      ( 05 Mar 2023 05:10 )             31.0     03-05    136  |  101  |  16  ----------------------------<  64<L>  3.3<L>   |  24  |  0.55    Ca    8.8      05 Mar 2023 05:10  Phos  3.3     03-05  Mg     1.90     03-05    PHYSICAL EXAM:  Gen: AAOx3, non-toxic  Head: NCAT  HEENT: EOMI, oral mucosa moist, normal conjunctiva  Lung: Breathing on RA, unlabored   Abd: soft, NTND, no guarding. YONATHAN w/SA output. Incision w/strikethrough   MSK: no visible deformities  Neuro: No focal sensory or motor deficits

## 2023-03-06 NOTE — DISCHARGE NOTE PROVIDER - NSDCFUADDINST_GEN_ALL_CORE_FT
WOUND CARE:  Keep wound clean and dry. Do not scrub or rub incisions. Do not use lotion or powder on incisions.  OSTOMY: Please care for ostomy as educated. You should allow warm soapy water to run down the wound in the shower. You do not need to scrub the area.   BATHING: Please do not submerge wound underwater. You may shower and/or sponge bathe.  ACTIVITY: No heavy lifting or straining. Otherwise, you may return to your usual level of physical activity. If you are taking narcotic pain medication (such as Percocet) DO NOT drive a car, operate machinery or make important decisions.  DIET: Return to your usual diet.  NOTIFY YOUR SURGEON IF: You have any bleeding that does not stop, any pus draining from your wound(s), any fever (over 100.4 F) or chills, persistent nausea/vomiting, persistent diarrhea, or if your pain is not controlled on your discharge pain medications.  FOLLOW-UP: Please follow up with your primary care physician in one week regarding your hospitalization

## 2023-03-06 NOTE — DISCHARGE NOTE PROVIDER - HOSPITAL COURSE
57F hx of diverticulitis with 1 day of severe lower abdominal pain and outpatient CT suggestive of perforated diverticulitis presented to [facility] ED.   Patient was admitted to colorectal surgery service and went to the operating room for a Devlin's procedure (intraoperative findings: purulent peritonitis, proximal sigmoid colon diverticular perforation)  Patient tolerated operation well and there were no post-operative complications identified. Patient remained hemodynamically stable in the PACU and transferred to the surgical floor.  Patient with NG tube that was ultimately removed POD #2. Patient seen and evaluated by ostomy care nurse for teaching and training.  **complete up to 3/6 ** Patient with ostomy function, diet was restarted and advanced as tolerated. Pain control was transitioned from IV to PO pain meds. At this time, patient is currently ambulating, voiding, tolerating a regular diet. Pain well controlled on PO pain meds. Patient felt safe with being discharged, and understood and agreed with plan. Per attending, patient is stable for discharge to home with outpatient follow up. Patient instructed to call office to make follow up appointment. 57F hx of diverticulitis with 1 day of severe lower abdominal pain and outpatient CT suggestive of perforated diverticulitis presented to [facility] ED.   Patient was admitted to colorectal surgery service and went to the operating room for a Devlin's procedure (intraoperative findings: purulent peritonitis, proximal sigmoid colon diverticular perforation)  Patient tolerated operation well and there were no post-operative complications identified. Patient remained hemodynamically stable in the PACU and transferred to the surgical floor.  Patient with NG tube that was ultimately removed POD #2. Patient seen and evaluated by ostomy care nurse for teaching and training.  Patient with ostomy function, diet was restarted and advanced as tolerated. Pain control was transitioned from IV to PO pain meds. At this time, patient is currently ambulating, voiding, tolerating a regular diet. Pain well controlled on PO pain meds. Patient felt safe with being discharged, and understood and agreed with plan. Per attending, patient is stable for discharge to home with outpatient follow up. Patient instructed to call office to make follow up appointment.

## 2023-03-06 NOTE — PROGRESS NOTE ADULT - SUBJECTIVE AND OBJECTIVE BOX
Anesthesia Pain Management Service    SUBJECTIVE: Patient is now off the IV PCA. Patient reports some discomfort on her abdomen at the time.    Pain Scale Score	At rest: __7/10_ 	With Activity: ___ 	[X ] Refer to charted pain scores    THERAPY:    [ ] IV PCA Morphine		[ ] 5 mg/mL	[ ] 1 mg/mL  [X ] IV PCA Hydromorphone	[ ] 5 mg/mL	[X ] 1 mg/mL  [ ] IV PCA Fentanyl		[ ] 50 micrograms/mL    Demand dose __0.2_ lockout __6_ (minutes) Continuous Rate _0__ Total: 5.2___   mg used (in past 24 hrs)      MEDICATIONS  (STANDING):  acetaminophen     Tablet .. 975 milliGRAM(s) Oral every 6 hours  dextrose 5% + sodium chloride 0.45% with potassium chloride 20 mEq/L 1000 milliLiter(s) (125 mL/Hr) IV Continuous <Continuous>  enoxaparin Injectable 40 milliGRAM(s) SubCutaneous every 24 hours  piperacillin/tazobactam IVPB.. 3.375 Gram(s) IV Intermittent every 8 hours    MEDICATIONS  (PRN):  naloxone Injectable 0.1 milliGRAM(s) IV Push every 3 minutes PRN For ANY of the following changes in patient status:  A. RR LESS THAN 10 breaths per minute, B. Oxygen saturation LESS THAN 90%, C. Sedation score of 6  ondansetron Injectable 4 milliGRAM(s) IV Push every 6 hours PRN Nausea  oxyCODONE    IR 5 milliGRAM(s) Oral every 6 hours PRN Severe Pain (7 - 10)  oxyCODONE    IR 2.5 milliGRAM(s) Oral every 6 hours PRN Moderate Pain (4 - 6)      OBJECTIVE: Patient sitting up in bed.    Sedation Score:	[ X] Alert	[ ] Drowsy 	[ ] Arousable	[ ] Asleep	[ ] Unresponsive    Side Effects:	[X ] None	[ ] Nausea	[ ] Vomiting	[ ] Pruritus  		[ ] Other:    Vital Signs Last 24 Hrs  T(C): 36.8 (06 Mar 2023 09:46), Max: 37.2 (05 Mar 2023 15:00)  T(F): 98.2 (06 Mar 2023 09:46), Max: 98.9 (05 Mar 2023 15:00)  HR: 74 (06 Mar 2023 09:46) (72 - 88)  BP: 113/61 (06 Mar 2023 09:46) (108/61 - 128/70)  BP(mean): --  RR: 18 (06 Mar 2023 09:46) (16 - 18)  SpO2: 97% (06 Mar 2023 09:46) (95% - 100%)    Parameters below as of 06 Mar 2023 09:46  Patient On (Oxygen Delivery Method): room air        ASSESSMENT/ PLAN    Therapy to  be:	[ ] Continue   [ X] Discontinued   [X ] Change to prn Analgesics    Documentation and Verification of current medications:   [X] Done	[ ] Not done, not elligible    Comments: IV PCA discontinued and PRN Oral/IV opioids and/or Adjuvant non-opioid medication  ordered by primary team.     Progress Note written now but Patient was seen earlier.

## 2023-03-06 NOTE — DISCHARGE NOTE PROVIDER - CARE PROVIDER_API CALL
Francisco Lr)  ColonRectal Surgery; Surgery  [address] [facility] [address], [address]  [place] [address]  Phone: [phone]  Fax: [phone]  Follow Up Time: 2 weeks

## 2023-03-06 NOTE — DISCHARGE NOTE PROVIDER - NSDCMRMEDTOKEN_GEN_ALL_CORE_FT
Lasix 20 mg oral tablet: 1 tab(s) orally twice a week  Vyvanse:   Wellbutrin:    acetaminophen 325 mg oral tablet: 3 tab(s) orally every 6 hours  Lasix 20 mg oral tablet: 1 tab(s) orally twice a week  Vyvanse:   Wellbutrin:    acetaminophen 325 mg oral tablet: 2 tab(s) orally every 6 hours, As Needed  Lasix 20 mg oral tablet: 1 tab(s) orally twice a week  oxyCODONE 5 mg oral tablet: 1 tab(s) orally every 6 hours, As Needed -for severe pain MDD:4   Vyvanse:   Wellbutrin: 1 tab(s) orally once a day

## 2023-03-06 NOTE — ADVANCED PRACTICE NURSE CONSULT - RECOMMEDATIONS
Supplies utilized:   Liquid barrier film    Skin barrier ring- item # [id number]  One piece drainable pouch- item #[id number]    Ostomy team will continue to follow.  Please contact Wound/Ostomy Care Service Line if we can be of further assistance (ext [phone]).

## 2023-03-07 LAB
ANION GAP SERPL CALC-SCNC: 9 MMOL/L — SIGNIFICANT CHANGE UP (ref 7–14)
BUN SERPL-MCNC: 3 MG/DL — LOW (ref 7–23)
CALCIUM SERPL-MCNC: 9 MG/DL — SIGNIFICANT CHANGE UP (ref 8.4–10.5)
CHLORIDE SERPL-SCNC: 104 MMOL/L — SIGNIFICANT CHANGE UP (ref 98–107)
CO2 SERPL-SCNC: 27 MMOL/L — SIGNIFICANT CHANGE UP (ref 22–31)
CREAT SERPL-MCNC: 0.55 MG/DL — SIGNIFICANT CHANGE UP (ref 0.5–1.3)
EGFR: 108 ML/MIN/1.73M2 — SIGNIFICANT CHANGE UP
GLUCOSE SERPL-MCNC: 100 MG/DL — HIGH (ref 70–99)
HCT VFR BLD CALC: 33.9 % — LOW (ref 34.5–45)
HGB BLD-MCNC: 10.8 G/DL — LOW (ref 11.5–15.5)
MAGNESIUM SERPL-MCNC: 1.7 MG/DL — SIGNIFICANT CHANGE UP (ref 1.6–2.6)
MCHC RBC-ENTMCNC: 30.9 PG — SIGNIFICANT CHANGE UP (ref 27–34)
MCHC RBC-ENTMCNC: 31.9 GM/DL — LOW (ref 32–36)
MCV RBC AUTO: 97.1 FL — SIGNIFICANT CHANGE UP (ref 80–100)
NRBC # BLD: 0 /100 WBCS — SIGNIFICANT CHANGE UP (ref 0–0)
NRBC # FLD: 0 K/UL — SIGNIFICANT CHANGE UP (ref 0–0)
PHOSPHATE SERPL-MCNC: 4.5 MG/DL — SIGNIFICANT CHANGE UP (ref 2.5–4.5)
PLATELET # BLD AUTO: 206 K/UL — SIGNIFICANT CHANGE UP (ref 150–400)
POTASSIUM SERPL-MCNC: 3.3 MMOL/L — LOW (ref 3.5–5.3)
POTASSIUM SERPL-SCNC: 3.3 MMOL/L — LOW (ref 3.5–5.3)
RBC # BLD: 3.49 M/UL — LOW (ref 3.8–5.2)
RBC # FLD: 12.7 % — SIGNIFICANT CHANGE UP (ref 10.3–14.5)
SODIUM SERPL-SCNC: 140 MMOL/L — SIGNIFICANT CHANGE UP (ref 135–145)
WBC # BLD: 4.62 K/UL — SIGNIFICANT CHANGE UP (ref 3.8–10.5)
WBC # FLD AUTO: 4.62 K/UL — SIGNIFICANT CHANGE UP (ref 3.8–10.5)

## 2023-03-07 RX ORDER — POTASSIUM CHLORIDE 20 MEQ
40 PACKET (EA) ORAL ONCE
Refills: 0 | Status: COMPLETED | OUTPATIENT
Start: 2023-03-07 | End: 2023-03-07

## 2023-03-07 RX ORDER — HYDROMORPHONE HYDROCHLORIDE 2 MG/ML
0.5 INJECTION INTRAMUSCULAR; INTRAVENOUS; SUBCUTANEOUS ONCE
Refills: 0 | Status: DISCONTINUED | OUTPATIENT
Start: 2023-03-07 | End: 2023-03-07

## 2023-03-07 RX ORDER — BUPROPION HYDROCHLORIDE 150 MG/1
150 TABLET, EXTENDED RELEASE ORAL DAILY
Refills: 0 | Status: DISCONTINUED | OUTPATIENT
Start: 2023-03-07 | End: 2023-03-10

## 2023-03-07 RX ORDER — POTASSIUM CHLORIDE 20 MEQ
10 PACKET (EA) ORAL
Refills: 0 | Status: DISCONTINUED | OUTPATIENT
Start: 2023-03-07 | End: 2023-03-07

## 2023-03-07 RX ORDER — MAGNESIUM SULFATE 500 MG/ML
2 VIAL (ML) INJECTION ONCE
Refills: 0 | Status: COMPLETED | OUTPATIENT
Start: 2023-03-07 | End: 2023-03-07

## 2023-03-07 RX ADMIN — Medication 975 MILLIGRAM(S): at 05:27

## 2023-03-07 RX ADMIN — OXYCODONE HYDROCHLORIDE 5 MILLIGRAM(S): 5 TABLET ORAL at 04:01

## 2023-03-07 RX ADMIN — OXYCODONE HYDROCHLORIDE 5 MILLIGRAM(S): 5 TABLET ORAL at 03:31

## 2023-03-07 RX ADMIN — OXYCODONE HYDROCHLORIDE 10 MILLIGRAM(S): 5 TABLET ORAL at 10:30

## 2023-03-07 RX ADMIN — Medication 975 MILLIGRAM(S): at 05:57

## 2023-03-07 RX ADMIN — PIPERACILLIN AND TAZOBACTAM 25 GRAM(S): 4; .5 INJECTION, POWDER, LYOPHILIZED, FOR SOLUTION INTRAVENOUS at 05:28

## 2023-03-07 RX ADMIN — PIPERACILLIN AND TAZOBACTAM 25 GRAM(S): 4; .5 INJECTION, POWDER, LYOPHILIZED, FOR SOLUTION INTRAVENOUS at 14:54

## 2023-03-07 RX ADMIN — PIPERACILLIN AND TAZOBACTAM 25 GRAM(S): 4; .5 INJECTION, POWDER, LYOPHILIZED, FOR SOLUTION INTRAVENOUS at 22:11

## 2023-03-07 RX ADMIN — OXYCODONE HYDROCHLORIDE 10 MILLIGRAM(S): 5 TABLET ORAL at 09:57

## 2023-03-07 RX ADMIN — Medication 975 MILLIGRAM(S): at 12:36

## 2023-03-07 RX ADMIN — Medication 975 MILLIGRAM(S): at 19:05

## 2023-03-07 RX ADMIN — Medication 975 MILLIGRAM(S): at 18:31

## 2023-03-07 RX ADMIN — HYDROMORPHONE HYDROCHLORIDE 0.5 MILLIGRAM(S): 2 INJECTION INTRAMUSCULAR; INTRAVENOUS; SUBCUTANEOUS at 13:10

## 2023-03-07 RX ADMIN — OXYCODONE HYDROCHLORIDE 10 MILLIGRAM(S): 5 TABLET ORAL at 22:31

## 2023-03-07 RX ADMIN — ENOXAPARIN SODIUM 40 MILLIGRAM(S): 100 INJECTION SUBCUTANEOUS at 05:28

## 2023-03-07 RX ADMIN — Medication 40 MILLIEQUIVALENT(S): at 08:28

## 2023-03-07 RX ADMIN — OXYCODONE HYDROCHLORIDE 10 MILLIGRAM(S): 5 TABLET ORAL at 17:02

## 2023-03-07 RX ADMIN — OXYCODONE HYDROCHLORIDE 10 MILLIGRAM(S): 5 TABLET ORAL at 17:35

## 2023-03-07 RX ADMIN — Medication 975 MILLIGRAM(S): at 13:10

## 2023-03-07 RX ADMIN — HYDROMORPHONE HYDROCHLORIDE 0.5 MILLIGRAM(S): 2 INJECTION INTRAMUSCULAR; INTRAVENOUS; SUBCUTANEOUS at 12:36

## 2023-03-07 RX ADMIN — Medication 25 GRAM(S): at 08:03

## 2023-03-07 RX ADMIN — OXYCODONE HYDROCHLORIDE 10 MILLIGRAM(S): 5 TABLET ORAL at 23:01

## 2023-03-07 NOTE — PROGRESS NOTE ADULT - SUBJECTIVE AND OBJECTIVE BOX
DATE OF SERVICE: 03-07-23 @ 09:17    INTERVAL HPI/OVERNIGHT EVENTS: Patient seen and examined, resting comfortably at bedside awake and alert. No acute events overnight. Patient reports tolerating clears without nausea, vomiting. Patient endorses voiding without issues and has been out of bed and ambulating. Denies fever, chills, SOB, or chest pain. Pain well controlled.     STATUS POST:  Ladonna Procedure for perforated diverticulitis    POST OPERATIVE DAY #: 4    MEDICATIONS  (STANDING):  acetaminophen     Tablet .. 975 milliGRAM(s) Oral every 6 hours  enoxaparin Injectable 40 milliGRAM(s) SubCutaneous every 24 hours  piperacillin/tazobactam IVPB.. 3.375 Gram(s) IV Intermittent every 8 hours    MEDICATIONS  (PRN):  naloxone Injectable 0.1 milliGRAM(s) IV Push every 3 minutes PRN For ANY of the following changes in patient status:  A. RR LESS THAN 10 breaths per minute, B. Oxygen saturation LESS THAN 90%, C. Sedation score of 6  ondansetron Injectable 4 milliGRAM(s) IV Push every 6 hours PRN Nausea  oxyCODONE    IR 5 milliGRAM(s) Oral every 4 hours PRN Moderate Pain (4 - 6)  oxyCODONE    IR 10 milliGRAM(s) Oral every 4 hours PRN Severe Pain (7 - 10)      Vital Signs Last 24 Hrs  T(C): 36.7 (07 Mar 2023 06:07), Max: 36.8 (06 Mar 2023 09:46)  T(F): 98.1 (07 Mar 2023 06:07), Max: 98.2 (06 Mar 2023 09:46)  HR: 81 (07 Mar 2023 06:07) (64 - 97)  BP: 134/82 (07 Mar 2023 06:07) (111/76 - 160/82)  BP(mean): --  RR: 18 (07 Mar 2023 06:07) (18 - 18)  SpO2: 99% (07 Mar 2023 06:07) (96% - 100%)    Parameters below as of 07 Mar 2023 06:07  Patient On (Oxygen Delivery Method): room air      I&O's Detail    06 Mar 2023 07:01  -  07 Mar 2023 07:00  --------------------------------------------------------  IN:    dextrose 5% + sodium chloride 0.45% w/ Additives: 2100 mL    IV PiggyBack: 100 mL    Oral Fluid: 1520 mL  Total IN: 3720 mL    OUT:    Bulb (mL): 30 mL    Colostomy (mL): 15 mL  Total OUT: 45 mL    Total NET: 3675 mL            Physical Exam:  General: WN/WD NAD  Respiratory: airway patent, respirations unlabored, no increased WoB  Neurology: A&Ox3, nonfocal, ALFARO x 4  Abdominal: Soft, NT, ND, no rebounding or guarding  YONATHAN Drain patent and functioning well with sanguinous output  Incision dressed with Aquacel, C/D/I, no surrounding edema or erythema, minimal strikethrough  Ostomy pink and viable with visible air and bowel sweat      LABS:                        10.8   4.62  )-----------( 206      ( 07 Mar 2023 06:30 )             33.9     03-07    140  |  104  |  3<L>  ----------------------------<  100<H>  3.3<L>   |  27  |  0.55    Ca    9.0      07 Mar 2023 06:30  Phos  4.5     03-07  Mg     1.70     03-07

## 2023-03-07 NOTE — PROGRESS NOTE ADULT - SUBJECTIVE AND OBJECTIVE BOX
Subjective:  No acute overnight events.  Patient seen and examined at bedside with surgical team during morning rounds.  Pain well controlled today. Tolerating clear liquids and ambulating frequently. Voiding well. + air in bag with small stool noted by patient.    PHYSICAL EXAM:  Gen: NAD  Head: NCAT  Lung: Breathing on RA, unlabored   Abd: soft, NTND, no guarding. YONATHAN w/SA output. Aquacel C/D/I. + air noted in ostomy, ostomy pink and viable.       T(C): 36.7 (03-07-23 @ 06:07), Max: 36.8 (03-06-23 @ 09:46)  HR: 81 (03-07-23 @ 06:07) (64 - 97)  BP: 134/82 (03-07-23 @ 06:07) (111/76 - 160/82)  RR: 18 (03-07-23 @ 06:07) (18 - 18)  SpO2: 99% (03-07-23 @ 06:07) (96% - 100%)      03-06-23 @ 07:01  -  03-07-23 @ 07:00  --------------------------------------------------------  IN: 3720 mL / OUT: 45 mL / NET: 3675 mL        LABS:  cret                        10.8   4.62  )-----------( 206      ( 07 Mar 2023 06:30 )             33.9     03-07    140  |  104  |  3<L>  ----------------------------<  100<H>  3.3<L>   |  27  |  0.55    Ca    9.0      07 Mar 2023 06:30  Phos  4.5     03-07  Mg     1.70     03-07            acetaminophen     Tablet .. 975 milliGRAM(s) Oral every 6 hours  dextrose 5% + sodium chloride 0.45% with potassium chloride 20 mEq/L 1000 milliLiter(s) IV Continuous <Continuous>  enoxaparin Injectable 40 milliGRAM(s) SubCutaneous every 24 hours  naloxone Injectable 0.1 milliGRAM(s) IV Push every 3 minutes PRN  ondansetron Injectable 4 milliGRAM(s) IV Push every 6 hours PRN  oxyCODONE    IR 5 milliGRAM(s) Oral every 4 hours PRN  oxyCODONE    IR 10 milliGRAM(s) Oral every 4 hours PRN  piperacillin/tazobactam IVPB.. 3.375 Gram(s) IV Intermittent every 8 hours  potassium chloride    Tablet ER 40 milliEquivalent(s) Oral once

## 2023-03-07 NOTE — PROGRESS NOTE ADULT - ASSESSMENT
57F hx of diverticulitis with 1 day of severe lower abdominal pain and outpatient CT suggestive of perforated diverticulitis s/p Ladonna 3/3    Plan:  - CLD/IVF  - Zosyn   - Tylenol ATC/oxy PRN  - DVT ppx     A TEAM SURGERY  [id number]

## 2023-03-07 NOTE — ADVANCED PRACTICE NURSE CONSULT - RECOMMEDATIONS
Supplies utilized:   Liquid barrier film    Skin barrier ring- item # [id number]  One piece drainable pouch- item #[id number]    Ostomy team will continue to follow.  Please contact Wound/Ostomy Care Service Line if we can be of further assistance (ext [phone]).

## 2023-03-07 NOTE — PROGRESS NOTE ADULT - ASSESSMENT
Patient seen and examined with Dr. Silva.    57 year old woman s/p Ladonna's Procedure for perforated diverticulitis, POD#4, recovering well on the floors.      PLAN:  - Diet: CLD  - c/w abx  - Activity: encourage Out of bed, IS  - Pain control  - VTE prophylaxis with Heparin subcutaneous    A Team Surgery [id number]

## 2023-03-08 LAB
ANION GAP SERPL CALC-SCNC: 11 MMOL/L — SIGNIFICANT CHANGE UP (ref 7–14)
BUN SERPL-MCNC: 6 MG/DL — LOW (ref 7–23)
CALCIUM SERPL-MCNC: 9.2 MG/DL — SIGNIFICANT CHANGE UP (ref 8.4–10.5)
CHLORIDE SERPL-SCNC: 103 MMOL/L — SIGNIFICANT CHANGE UP (ref 98–107)
CO2 SERPL-SCNC: 26 MMOL/L — SIGNIFICANT CHANGE UP (ref 22–31)
CREAT SERPL-MCNC: 0.71 MG/DL — SIGNIFICANT CHANGE UP (ref 0.5–1.3)
CULTURE RESULTS: SIGNIFICANT CHANGE UP
EGFR: 100 ML/MIN/1.73M2 — SIGNIFICANT CHANGE UP
GLUCOSE SERPL-MCNC: 89 MG/DL — SIGNIFICANT CHANGE UP (ref 70–99)
HCT VFR BLD CALC: 33.3 % — LOW (ref 34.5–45)
HGB BLD-MCNC: 10.5 G/DL — LOW (ref 11.5–15.5)
MAGNESIUM SERPL-MCNC: 2.3 MG/DL — SIGNIFICANT CHANGE UP (ref 1.6–2.6)
MCHC RBC-ENTMCNC: 31.3 PG — SIGNIFICANT CHANGE UP (ref 27–34)
MCHC RBC-ENTMCNC: 31.5 GM/DL — LOW (ref 32–36)
MCV RBC AUTO: 99.1 FL — SIGNIFICANT CHANGE UP (ref 80–100)
NRBC # BLD: 0 /100 WBCS — SIGNIFICANT CHANGE UP (ref 0–0)
NRBC # FLD: 0 K/UL — SIGNIFICANT CHANGE UP (ref 0–0)
ORGANISM # SPEC MICROSCOPIC CNT: SIGNIFICANT CHANGE UP
PHOSPHATE SERPL-MCNC: 5.2 MG/DL — HIGH (ref 2.5–4.5)
PLATELET # BLD AUTO: 244 K/UL — SIGNIFICANT CHANGE UP (ref 150–400)
POTASSIUM SERPL-MCNC: 4.1 MMOL/L — SIGNIFICANT CHANGE UP (ref 3.5–5.3)
POTASSIUM SERPL-SCNC: 4.1 MMOL/L — SIGNIFICANT CHANGE UP (ref 3.5–5.3)
RBC # BLD: 3.36 M/UL — LOW (ref 3.8–5.2)
RBC # FLD: 13.2 % — SIGNIFICANT CHANGE UP (ref 10.3–14.5)
SODIUM SERPL-SCNC: 140 MMOL/L — SIGNIFICANT CHANGE UP (ref 135–145)
SPECIMEN SOURCE: SIGNIFICANT CHANGE UP
WBC # BLD: 5.35 K/UL — SIGNIFICANT CHANGE UP (ref 3.8–10.5)
WBC # FLD AUTO: 5.35 K/UL — SIGNIFICANT CHANGE UP (ref 3.8–10.5)

## 2023-03-08 RX ORDER — ACETAMINOPHEN 500 MG
2 TABLET ORAL
Qty: 0 | Refills: 0 | DISCHARGE
Start: 2023-03-08

## 2023-03-08 RX ORDER — ACETAMINOPHEN 500 MG
3 TABLET ORAL
Qty: 0 | Refills: 0 | DISCHARGE
Start: 2023-03-08

## 2023-03-08 RX ADMIN — OXYCODONE HYDROCHLORIDE 10 MILLIGRAM(S): 5 TABLET ORAL at 07:11

## 2023-03-08 RX ADMIN — PIPERACILLIN AND TAZOBACTAM 25 GRAM(S): 4; .5 INJECTION, POWDER, LYOPHILIZED, FOR SOLUTION INTRAVENOUS at 06:33

## 2023-03-08 RX ADMIN — Medication 975 MILLIGRAM(S): at 07:10

## 2023-03-08 RX ADMIN — Medication 975 MILLIGRAM(S): at 12:24

## 2023-03-08 RX ADMIN — OXYCODONE HYDROCHLORIDE 10 MILLIGRAM(S): 5 TABLET ORAL at 15:54

## 2023-03-08 RX ADMIN — Medication 975 MILLIGRAM(S): at 00:02

## 2023-03-08 RX ADMIN — Medication 975 MILLIGRAM(S): at 06:34

## 2023-03-08 RX ADMIN — BUPROPION HYDROCHLORIDE 150 MILLIGRAM(S): 150 TABLET, EXTENDED RELEASE ORAL at 15:54

## 2023-03-08 RX ADMIN — Medication 975 MILLIGRAM(S): at 17:44

## 2023-03-08 RX ADMIN — OXYCODONE HYDROCHLORIDE 10 MILLIGRAM(S): 5 TABLET ORAL at 11:22

## 2023-03-08 RX ADMIN — Medication 975 MILLIGRAM(S): at 00:32

## 2023-03-08 RX ADMIN — PIPERACILLIN AND TAZOBACTAM 25 GRAM(S): 4; .5 INJECTION, POWDER, LYOPHILIZED, FOR SOLUTION INTRAVENOUS at 23:33

## 2023-03-08 RX ADMIN — OXYCODONE HYDROCHLORIDE 10 MILLIGRAM(S): 5 TABLET ORAL at 11:53

## 2023-03-08 RX ADMIN — OXYCODONE HYDROCHLORIDE 10 MILLIGRAM(S): 5 TABLET ORAL at 16:46

## 2023-03-08 RX ADMIN — Medication 975 MILLIGRAM(S): at 23:33

## 2023-03-08 RX ADMIN — ENOXAPARIN SODIUM 40 MILLIGRAM(S): 100 INJECTION SUBCUTANEOUS at 06:33

## 2023-03-08 RX ADMIN — PIPERACILLIN AND TAZOBACTAM 25 GRAM(S): 4; .5 INJECTION, POWDER, LYOPHILIZED, FOR SOLUTION INTRAVENOUS at 15:54

## 2023-03-08 RX ADMIN — OXYCODONE HYDROCHLORIDE 10 MILLIGRAM(S): 5 TABLET ORAL at 07:41

## 2023-03-08 NOTE — ADVANCED PRACTICE NURSE CONSULT - RECOMMEDATIONS
Supplies utilized:   Liquid barrier film    Skin barrier ring- item # [id number]  One piece drainable pouch- item #[id number]    Two weeks of supplies provided to patient at bedside. Ostomy team will continue to follow.  Please contact Wound/Ostomy Care Service Line if we can be of further assistance (ext [phone]).   Supplies utilized:   Liquid barrier film    Skin barrier ring- item # [id number]  One piece drainable pouch- item #[id number]    Two weeks of supplies provided to patient at bedside.   Ostomy team will continue to follow.  Please contact Wound/Ostomy Care Service Line if we can be of further assistance (ext [phone]).

## 2023-03-08 NOTE — PROGRESS NOTE ADULT - ASSESSMENT
57 year old woman w/no sig PmHx s/p Ladonna's Procedure for perforated diverticulitis (3/4) recovering well on the floors.      PLAN:  - LRD   - c/w abx  - Activity: encourage Out of bed, IS  - Pain control  - VTE prophylaxis with Heparin subcutaneous    A Team Surgery [id number]    57 year old woman w/no sig PmHx s/p Ladonna's Procedure for perforated diverticulitis (3/4) recovering well on the floors.      PLAN:  - LRD   - c/w abx  - Activity: encourage Out of bed, IS  - Pain control  - VTE prophylaxis with Heparin subcutaneous    A Team Surgery [id number]     Pt seen and examined  doing great  awaiting colostomy fx  stoma teaching    Francisco Lr MD

## 2023-03-08 NOTE — PROGRESS NOTE ADULT - SUBJECTIVE AND OBJECTIVE BOX
SURGERY DAILY PROGRESS NOTE:     SUBJECTIVE/ROS: Patient seen at bedside this AM. Doing well, denies n/v, pain controlled    24h Events:   - Overnight, no acute events    OBJECTIVE:  Vital Signs Last 24 Hrs  T(C): 36.7 (08 Mar 2023 01:57), Max: 37.1 (07 Mar 2023 18:02)  T(F): 98.1 (08 Mar 2023 01:57), Max: 98.7 (07 Mar 2023 18:02)  HR: 78 (08 Mar 2023 01:57) (74 - 96)  BP: 109/70 (08 Mar 2023 01:57) (109/70 - 128/78)  BP(mean): --  RR: 17 (08 Mar 2023 01:57) (17 - 18)  SpO2: 96% (08 Mar 2023 01:57) (96% - 100%)    Parameters below as of 08 Mar 2023 01:57  Patient On (Oxygen Delivery Method): room air      I&O's Detail    07 Mar 2023 07:01  -  08 Mar 2023 07:00  --------------------------------------------------------  IN:    Oral Fluid: 1230 mL  Total IN: 1230 mL    OUT:    Bulb (mL): 20 mL    Colostomy (mL): 15 mL    Voided (mL): 750 mL  Total OUT: 785 mL    Total NET: 445 mL        Daily     Daily   MEDICATIONS  (STANDING):  acetaminophen     Tablet .. 975 milliGRAM(s) Oral every 6 hours  buPROPion XL (24-Hour) . 150 milliGRAM(s) Oral daily  enoxaparin Injectable 40 milliGRAM(s) SubCutaneous every 24 hours  piperacillin/tazobactam IVPB.. 3.375 Gram(s) IV Intermittent every 8 hours    MEDICATIONS  (PRN):  naloxone Injectable 0.1 milliGRAM(s) IV Push every 3 minutes PRN For ANY of the following changes in patient status:  A. RR LESS THAN 10 breaths per minute, B. Oxygen saturation LESS THAN 90%, C. Sedation score of 6  oxyCODONE    IR 5 milliGRAM(s) Oral every 4 hours PRN Moderate Pain (4 - 6)  oxyCODONE    IR 10 milliGRAM(s) Oral every 4 hours PRN Severe Pain (7 - 10)      LABS:                        10.5   5.35  )-----------( 244      ( 08 Mar 2023 05:26 )             33.3     03-08    140  |  103  |  6<L>  ----------------------------<  89  4.1   |  26  |  0.71    Ca    9.2      08 Mar 2023 05:26  Phos  5.2     03-08  Mg     2.30     03-08          PHYSICAL EXAM:  Gen: AAOx3, non-toxic  Head: NCAT  HEENT: EOMI, oral mucosa moist, normal conjunctiva  Lung: Breathing on RA, unlabored   Abd: soft, NTND, no guarding. Ostomy w/out stool +Air   MSK: no visible deformities  Neuro: No focal sensory or motor deficits

## 2023-03-08 NOTE — ADVANCED PRACTICE NURSE CONSULT - ASSESSMENT
Patient AxOx4, ready and willing to learn. Patient agreeable to participate in pouch change. Overview of surgical procedure and need of ostomy reinforced. Terms defined utilized: Ostomy, colostomy, wafer, pouch. Frequency of pouch change and emptying discussed, teach back method utilized. Stool consistency with colostomy reviewed. Patient shown how to open, empty and close pouch and able to demonstrate with teach back method. Patient removed colostomy pouch using pull and push technique, cleansed skin with water, patted dry. Emotional support and encouragement provided. Taught patient how to properly assess stoma viability and peristomal skin. Patient shown and performed with how to perform stoma measurement, creating template, cutting wafer, applying barrier ring (to protect peristomal skin from enzymatic irritation), and applying pouch. Reviewed s/s to report to MD. Importance of hydration reinforced.      Stoma size: 1 1/2"(Top to bottom) x 2" (side to side). See A&I flowsheet for full assessment details.  Patient AxOx4, ready and willing to learn. Patient agreeable to participate in pouch change. Overview of surgical procedure and need of ostomy reinforced. Terms defined utilized: Ostomy, colostomy, wafer, pouch. Frequency of pouch change and emptying discussed, teach back method utilized. Stool consistency with colostomy reviewed. Patient shown how to open, empty and close pouch and able to demonstrate with teach back method. Patient removed colostomy pouch using pull and push technique, cleansed skin with water, patted dry. Emotional support and encouragement provided. Taught patient how to properly assess stoma viability and peristomal skin. Patient shown and performed with how to perform stoma measurement, creating template, cutting wafer, applying barrier ring (to protect peristomal skin from enzymatic irritation), and applying pouch. Reviewed s/s to report to MD. Importance of hydration reinforced.      Stoma size: 1 1/2"(Top to bottom) x 2" (side to side). See A&I flowsheet for full assessment details.   *of note, upon assessment of patient; small amount of serosanguinous drainage noted in ostomy pouch. No bowel movement noted in pouch. Patient states she has been hearing gas come from the stoma occasionally. Patient endorses area of tenderness/localized pain to RLQ. Upon palpation, area noted to be firm. Patient endorses eating regular food today. Spoke to MD Segundo Villatoro of Surgery team A in regards to assessment. At this time, no interventions ordered as per MD, but patient to be reassessed by team at later point.

## 2023-03-09 LAB
ANION GAP SERPL CALC-SCNC: 10 MMOL/L — SIGNIFICANT CHANGE UP (ref 7–14)
BUN SERPL-MCNC: 7 MG/DL — SIGNIFICANT CHANGE UP (ref 7–23)
CALCIUM SERPL-MCNC: 9 MG/DL — SIGNIFICANT CHANGE UP (ref 8.4–10.5)
CHLORIDE SERPL-SCNC: 102 MMOL/L — SIGNIFICANT CHANGE UP (ref 98–107)
CO2 SERPL-SCNC: 27 MMOL/L — SIGNIFICANT CHANGE UP (ref 22–31)
CREAT SERPL-MCNC: 0.62 MG/DL — SIGNIFICANT CHANGE UP (ref 0.5–1.3)
EGFR: 105 ML/MIN/1.73M2 — SIGNIFICANT CHANGE UP
GLUCOSE SERPL-MCNC: 85 MG/DL — SIGNIFICANT CHANGE UP (ref 70–99)
HCT VFR BLD CALC: 31.6 % — LOW (ref 34.5–45)
HGB BLD-MCNC: 10.1 G/DL — LOW (ref 11.5–15.5)
MAGNESIUM SERPL-MCNC: 2.2 MG/DL — SIGNIFICANT CHANGE UP (ref 1.6–2.6)
MCHC RBC-ENTMCNC: 30.5 PG — SIGNIFICANT CHANGE UP (ref 27–34)
MCHC RBC-ENTMCNC: 32 GM/DL — SIGNIFICANT CHANGE UP (ref 32–36)
MCV RBC AUTO: 95.5 FL — SIGNIFICANT CHANGE UP (ref 80–100)
NRBC # BLD: 0 /100 WBCS — SIGNIFICANT CHANGE UP (ref 0–0)
NRBC # FLD: 0 K/UL — SIGNIFICANT CHANGE UP (ref 0–0)
PHOSPHATE SERPL-MCNC: 4.1 MG/DL — SIGNIFICANT CHANGE UP (ref 2.5–4.5)
PLATELET # BLD AUTO: 300 K/UL — SIGNIFICANT CHANGE UP (ref 150–400)
POTASSIUM SERPL-MCNC: 3.9 MMOL/L — SIGNIFICANT CHANGE UP (ref 3.5–5.3)
POTASSIUM SERPL-SCNC: 3.9 MMOL/L — SIGNIFICANT CHANGE UP (ref 3.5–5.3)
RBC # BLD: 3.31 M/UL — LOW (ref 3.8–5.2)
RBC # FLD: 13.1 % — SIGNIFICANT CHANGE UP (ref 10.3–14.5)
SODIUM SERPL-SCNC: 139 MMOL/L — SIGNIFICANT CHANGE UP (ref 135–145)
WBC # BLD: 5.04 K/UL — SIGNIFICANT CHANGE UP (ref 3.8–10.5)
WBC # FLD AUTO: 5.04 K/UL — SIGNIFICANT CHANGE UP (ref 3.8–10.5)

## 2023-03-09 RX ORDER — MAGNESIUM HYDROXIDE 400 MG/1
30 TABLET, CHEWABLE ORAL EVERY 24 HOURS
Refills: 0 | Status: DISCONTINUED | OUTPATIENT
Start: 2023-03-09 | End: 2023-03-10

## 2023-03-09 RX ADMIN — OXYCODONE HYDROCHLORIDE 10 MILLIGRAM(S): 5 TABLET ORAL at 19:50

## 2023-03-09 RX ADMIN — Medication 975 MILLIGRAM(S): at 19:49

## 2023-03-09 RX ADMIN — Medication 975 MILLIGRAM(S): at 05:54

## 2023-03-09 RX ADMIN — MAGNESIUM HYDROXIDE 30 MILLILITER(S): 400 TABLET, CHEWABLE ORAL at 14:48

## 2023-03-09 RX ADMIN — Medication 975 MILLIGRAM(S): at 06:27

## 2023-03-09 RX ADMIN — OXYCODONE HYDROCHLORIDE 10 MILLIGRAM(S): 5 TABLET ORAL at 01:36

## 2023-03-09 RX ADMIN — ENOXAPARIN SODIUM 40 MILLIGRAM(S): 100 INJECTION SUBCUTANEOUS at 05:54

## 2023-03-09 RX ADMIN — Medication 975 MILLIGRAM(S): at 23:44

## 2023-03-09 RX ADMIN — Medication 975 MILLIGRAM(S): at 00:04

## 2023-03-09 RX ADMIN — Medication 975 MILLIGRAM(S): at 13:33

## 2023-03-09 RX ADMIN — Medication 975 MILLIGRAM(S): at 13:03

## 2023-03-09 RX ADMIN — OXYCODONE HYDROCHLORIDE 10 MILLIGRAM(S): 5 TABLET ORAL at 10:06

## 2023-03-09 RX ADMIN — OXYCODONE HYDROCHLORIDE 5 MILLIGRAM(S): 5 TABLET ORAL at 23:44

## 2023-03-09 RX ADMIN — PIPERACILLIN AND TAZOBACTAM 25 GRAM(S): 4; .5 INJECTION, POWDER, LYOPHILIZED, FOR SOLUTION INTRAVENOUS at 05:54

## 2023-03-09 RX ADMIN — PIPERACILLIN AND TAZOBACTAM 25 GRAM(S): 4; .5 INJECTION, POWDER, LYOPHILIZED, FOR SOLUTION INTRAVENOUS at 14:49

## 2023-03-09 RX ADMIN — OXYCODONE HYDROCHLORIDE 10 MILLIGRAM(S): 5 TABLET ORAL at 17:06

## 2023-03-09 RX ADMIN — Medication 975 MILLIGRAM(S): at 19:17

## 2023-03-09 RX ADMIN — OXYCODONE HYDROCHLORIDE 10 MILLIGRAM(S): 5 TABLET ORAL at 08:59

## 2023-03-09 RX ADMIN — OXYCODONE HYDROCHLORIDE 10 MILLIGRAM(S): 5 TABLET ORAL at 01:15

## 2023-03-09 RX ADMIN — BUPROPION HYDROCHLORIDE 150 MILLIGRAM(S): 150 TABLET, EXTENDED RELEASE ORAL at 13:03

## 2023-03-09 NOTE — PROGRESS NOTE ADULT - ASSESSMENT
57 year old woman w/no sig PmHx s/p Ladonna's Procedure for perforated diverticulitis (3/4) recovering well on the floors.      PLAN:  - LRD   - AORBF  - c/w abx, plan to d/c once pt leaves hospital   - Activity: encourage Out of bed, IS  - Pain control  - VTE prophylaxis with Heparin subcutaneous    A Team Surgery [id number]

## 2023-03-09 NOTE — PROGRESS NOTE ADULT - ASSESSMENT
Patient seen and examined with Dr. Lr.    57 year old woman s/p Ladonna's Procedure, POD#6 recovering well on the floors      PLAN:  - Diet: LRD as tolerated  - Activity: encourage Out of bed, IS  - Pain control  - VTE prophylaxis with Heparin subcutaneous  - Dispo planning poss tomorrow    A Team Surgery [id number]  Patient seen and examined with Dr. Lr.    57 year old woman s/p Ladonna's Procedure, POD#6 recovering well on the floors      PLAN:  - Diet: LRD as tolerated  - Activity: encourage Out of bed, IS  - Pain control  - VTE prophylaxis with Heparin subcutaneous    A Team Surgery [id number]

## 2023-03-09 NOTE — PROGRESS NOTE ADULT - SUBJECTIVE AND OBJECTIVE BOX
SURGERY DAILY PROGRESS NOTE:     SUBJECTIVE/ROS: Patient seen at bedside this AM. Doing well, denies n/v, tolerating diet, +air in ostomy w/o stool     24h Events:   - Overnight, no acute events    OBJECTIVE:  Vital Signs Last 24 Hrs  T(C): 36.6 (09 Mar 2023 06:20), Max: 37.1 (08 Mar 2023 22:00)  T(F): 97.8 (09 Mar 2023 06:20), Max: 98.8 (08 Mar 2023 22:00)  HR: 81 (09 Mar 2023 06:20) (80 - 107)  BP: 104/68 (09 Mar 2023 06:20) (104/68 - 129/75)  BP(mean): --  RR: 18 (09 Mar 2023 06:20) (16 - 18)  SpO2: 100% (09 Mar 2023 06:20) (97% - 100%)    Parameters below as of 09 Mar 2023 06:20  Patient On (Oxygen Delivery Method): room air      I&O's Detail    08 Mar 2023 07:01  -  09 Mar 2023 07:00  --------------------------------------------------------  IN:    Oral Fluid: 1390 mL  Total IN: 1390 mL    OUT:    Bulb (mL): 11 mL    Colostomy (mL): 0 mL    Voided (mL): 900 mL  Total OUT: 911 mL    Total NET: 479 mL        Daily     Daily   MEDICATIONS  (STANDING):  acetaminophen     Tablet .. 975 milliGRAM(s) Oral every 6 hours  buPROPion XL (24-Hour) . 150 milliGRAM(s) Oral daily  enoxaparin Injectable 40 milliGRAM(s) SubCutaneous every 24 hours  piperacillin/tazobactam IVPB.. 3.375 Gram(s) IV Intermittent every 8 hours    MEDICATIONS  (PRN):  naloxone Injectable 0.1 milliGRAM(s) IV Push every 3 minutes PRN For ANY of the following changes in patient status:  A. RR LESS THAN 10 breaths per minute, B. Oxygen saturation LESS THAN 90%, C. Sedation score of 6  oxyCODONE    IR 10 milliGRAM(s) Oral every 4 hours PRN Severe Pain (7 - 10)  oxyCODONE    IR 5 milliGRAM(s) Oral every 4 hours PRN Moderate Pain (4 - 6)      LABS:                        10.1   5.04  )-----------( 300      ( 09 Mar 2023 05:25 )             31.6     03-09    139  |  102  |  7   ----------------------------<  85  3.9   |  27  |  0.62    Ca    9.0      09 Mar 2023 05:25  Phos  4.1     03-09  Mg     2.20     03-09          PHYSICAL EXAM:  Gen: AAOx3, non-toxic  Head: NCAT  HEENT: EOMI, oral mucosa moist, normal conjunctiva  Lung: Breathing on RA, unlabored   Abd: soft, NTND, no guarding. Ostomy w/air  MSK: no visible deformities  Neuro: No focal sensory or motor deficits

## 2023-03-09 NOTE — PROGRESS NOTE ADULT - SUBJECTIVE AND OBJECTIVE BOX
DATE OF SERVICE: 03-09-23 @ 13:44    INTERVAL HPI/OVERNIGHT EVENTS: Patient seen and examined, resting comfortably at bedside awake and alert. No acute events overnight. Patient reports tolerating diet without nausea, vomiting. Patient reports minimal +liquid stool present in ostomy. Patient endorses voiding without issues and has been out of bed and ambulating. Denies fever, chills, SOB, or chest pain. Pain well controlled.     STATUS POST:  Ladonna's Procedure    POST OPERATIVE DAY #: 6    MEDICATIONS  (STANDING):  acetaminophen     Tablet .. 975 milliGRAM(s) Oral every 6 hours  buPROPion XL (24-Hour) . 150 milliGRAM(s) Oral daily  enoxaparin Injectable 40 milliGRAM(s) SubCutaneous every 24 hours  magnesium hydroxide Suspension 30 milliLiter(s) Oral every 24 hours  piperacillin/tazobactam IVPB.. 3.375 Gram(s) IV Intermittent every 8 hours    MEDICATIONS  (PRN):  naloxone Injectable 0.1 milliGRAM(s) IV Push every 3 minutes PRN For ANY of the following changes in patient status:  A. RR LESS THAN 10 breaths per minute, B. Oxygen saturation LESS THAN 90%, C. Sedation score of 6  oxyCODONE    IR 5 milliGRAM(s) Oral every 4 hours PRN Moderate Pain (4 - 6)  oxyCODONE    IR 10 milliGRAM(s) Oral every 4 hours PRN Severe Pain (7 - 10)      Vital Signs Last 24 Hrs  T(C): 36.4 (09 Mar 2023 10:34), Max: 37.1 (08 Mar 2023 22:00)  T(F): 97.5 (09 Mar 2023 10:34), Max: 98.8 (08 Mar 2023 22:00)  HR: 81 (09 Mar 2023 06:20) (80 - 107)  BP: 118/60 (09 Mar 2023 10:34) (104/68 - 129/75)  BP(mean): --  RR: 18 (09 Mar 2023 10:34) (16 - 18)  SpO2: 100% (09 Mar 2023 06:20) (97% - 100%)    Parameters below as of 09 Mar 2023 10:34  Patient On (Oxygen Delivery Method): room air      I&O's Detail    08 Mar 2023 07:01  -  09 Mar 2023 07:00  --------------------------------------------------------  IN:    Oral Fluid: 1390 mL  Total IN: 1390 mL    OUT:    Bulb (mL): 11 mL    Colostomy (mL): 0 mL    Voided (mL): 900 mL  Total OUT: 911 mL    Total NET: 479 mL      09 Mar 2023 07:01  -  09 Mar 2023 13:44  --------------------------------------------------------  IN:    Oral Fluid: 240 mL  Total IN: 240 mL    OUT:    Bulb (mL): 0 mL    Colostomy (mL): 0 mL    Voided (mL): 400 mL  Total OUT: 400 mL    Total NET: -160 mL            Physical Exam:  General: WN/WD NAD  Respiratory: airway patent, respirations unlabored, no increased WoB  Neurology: A&Ox3, nonfocal, ALFARO x 4  Abdominal: Soft, NT, ND, no rebounding or guarding  Midline Incision closed with staples C/D/I, no edema or erythema  Ostomy is pink and viable with liquid stool present  YONATHAN Drain patent and functioning well with minimal ssf output      LABS:                        10.1   5.04  )-----------( 300      ( 09 Mar 2023 05:25 )             31.6     03-09    139  |  102  |  7   ----------------------------<  85  3.9   |  27  |  0.62    Ca    9.0      09 Mar 2023 05:25  Phos  4.1     03-09  Mg     2.20     03-09            RADIOLOGY & ADDITIONAL STUDIES: DATE OF SERVICE: 03-09-23 @ 13:44    INTERVAL HPI/OVERNIGHT EVENTS: Patient seen and examined, resting comfortably at bedside awake and alert. No acute events overnight. Patient reports tolerating diet without nausea, vomiting. Patient reports minimal +liquid stool present in ostomy. Patient endorses voiding without issues and has been out of bed and ambulating. Denies fever, chills, SOB, or chest pain. Pain well controlled.     STATUS POST:  Ladonna's Procedure    POST OPERATIVE DAY #: 6    MEDICATIONS  (STANDING):  acetaminophen     Tablet .. 975 milliGRAM(s) Oral every 6 hours  buPROPion XL (24-Hour) . 150 milliGRAM(s) Oral daily  enoxaparin Injectable 40 milliGRAM(s) SubCutaneous every 24 hours  magnesium hydroxide Suspension 30 milliLiter(s) Oral every 24 hours  piperacillin/tazobactam IVPB.. 3.375 Gram(s) IV Intermittent every 8 hours    MEDICATIONS  (PRN):  naloxone Injectable 0.1 milliGRAM(s) IV Push every 3 minutes PRN For ANY of the following changes in patient status:  A. RR LESS THAN 10 breaths per minute, B. Oxygen saturation LESS THAN 90%, C. Sedation score of 6  oxyCODONE    IR 5 milliGRAM(s) Oral every 4 hours PRN Moderate Pain (4 - 6)  oxyCODONE    IR 10 milliGRAM(s) Oral every 4 hours PRN Severe Pain (7 - 10)      Vital Signs Last 24 Hrs  T(C): 36.4 (09 Mar 2023 10:34), Max: 37.1 (08 Mar 2023 22:00)  T(F): 97.5 (09 Mar 2023 10:34), Max: 98.8 (08 Mar 2023 22:00)  HR: 81 (09 Mar 2023 06:20) (80 - 107)  BP: 118/60 (09 Mar 2023 10:34) (104/68 - 129/75)  BP(mean): --  RR: 18 (09 Mar 2023 10:34) (16 - 18)  SpO2: 100% (09 Mar 2023 06:20) (97% - 100%)    Parameters below as of 09 Mar 2023 10:34  Patient On (Oxygen Delivery Method): room air      I&O's Detail    08 Mar 2023 07:01  -  09 Mar 2023 07:00  --------------------------------------------------------  IN:    Oral Fluid: 1390 mL  Total IN: 1390 mL    OUT:    Bulb (mL): 11 mL    Colostomy (mL): 0 mL    Voided (mL): 900 mL  Total OUT: 911 mL    Total NET: 479 mL      09 Mar 2023 07:01  -  09 Mar 2023 13:44  --------------------------------------------------------  IN:    Oral Fluid: 240 mL  Total IN: 240 mL    OUT:    Bulb (mL): 0 mL    Colostomy (mL): 0 mL    Voided (mL): 400 mL  Total OUT: 400 mL    Total NET: -160 mL            Physical Exam:  General: WN/WD NAD  Respiratory: airway patent, respirations unlabored, no increased WoB  Neurology: A&Ox3, nonfocal, ALFARO x 4  Abdominal: Soft, NT, ND, no rebounding or guarding  Midline Incision closed with staples C/D/I, no edema or erythema  Ostomy is pink and viable with liquid stool present  YONATHAN Drain patent and functioning well with minimal ssf output      LABS:                        10.1   5.04  )-----------( 300      ( 09 Mar 2023 05:25 )             31.6     03-09    139  |  102  |  7   ----------------------------<  85  3.9   |  27  |  0.62    Ca    9.0      09 Mar 2023 05:25  Phos  4.1     03-09  Mg     2.20     03-09

## 2023-03-10 ENCOUNTER — TRANSCRIPTION ENCOUNTER (OUTPATIENT)
Age: 58
End: 2023-03-10

## 2023-03-10 VITALS — WEIGHT: 126.55 LBS

## 2023-03-10 LAB
ANION GAP SERPL CALC-SCNC: 7 MMOL/L — SIGNIFICANT CHANGE UP (ref 7–14)
BUN SERPL-MCNC: 10 MG/DL — SIGNIFICANT CHANGE UP (ref 7–23)
CALCIUM SERPL-MCNC: 9 MG/DL — SIGNIFICANT CHANGE UP (ref 8.4–10.5)
CHLORIDE SERPL-SCNC: 103 MMOL/L — SIGNIFICANT CHANGE UP (ref 98–107)
CO2 SERPL-SCNC: 28 MMOL/L — SIGNIFICANT CHANGE UP (ref 22–31)
CREAT SERPL-MCNC: 0.62 MG/DL — SIGNIFICANT CHANGE UP (ref 0.5–1.3)
EGFR: 105 ML/MIN/1.73M2 — SIGNIFICANT CHANGE UP
GLUCOSE SERPL-MCNC: 84 MG/DL — SIGNIFICANT CHANGE UP (ref 70–99)
HCT VFR BLD CALC: 31.9 % — LOW (ref 34.5–45)
HGB BLD-MCNC: 9.9 G/DL — LOW (ref 11.5–15.5)
MAGNESIUM SERPL-MCNC: 2.2 MG/DL — SIGNIFICANT CHANGE UP (ref 1.6–2.6)
MCHC RBC-ENTMCNC: 31 GM/DL — LOW (ref 32–36)
MCHC RBC-ENTMCNC: 31 PG — SIGNIFICANT CHANGE UP (ref 27–34)
MCV RBC AUTO: 100 FL — SIGNIFICANT CHANGE UP (ref 80–100)
NRBC # BLD: 0 /100 WBCS — SIGNIFICANT CHANGE UP (ref 0–0)
NRBC # FLD: 0 K/UL — SIGNIFICANT CHANGE UP (ref 0–0)
PHOSPHATE SERPL-MCNC: 3.7 MG/DL — SIGNIFICANT CHANGE UP (ref 2.5–4.5)
PLATELET # BLD AUTO: 362 K/UL — SIGNIFICANT CHANGE UP (ref 150–400)
POTASSIUM SERPL-MCNC: 3.9 MMOL/L — SIGNIFICANT CHANGE UP (ref 3.5–5.3)
POTASSIUM SERPL-SCNC: 3.9 MMOL/L — SIGNIFICANT CHANGE UP (ref 3.5–5.3)
RBC # BLD: 3.19 M/UL — LOW (ref 3.8–5.2)
RBC # FLD: 13.3 % — SIGNIFICANT CHANGE UP (ref 10.3–14.5)
SODIUM SERPL-SCNC: 138 MMOL/L — SIGNIFICANT CHANGE UP (ref 135–145)
WBC # BLD: 5.18 K/UL — SIGNIFICANT CHANGE UP (ref 3.8–10.5)
WBC # FLD AUTO: 5.18 K/UL — SIGNIFICANT CHANGE UP (ref 3.8–10.5)

## 2023-03-10 RX ORDER — BUPROPION HYDROCHLORIDE 150 MG/1
0 TABLET, EXTENDED RELEASE ORAL
Qty: 0 | Refills: 0 | DISCHARGE

## 2023-03-10 RX ORDER — OXYCODONE HYDROCHLORIDE 5 MG/1
1 TABLET ORAL
Qty: 12 | Refills: 0
Start: 2023-03-10 | End: 2023-03-12

## 2023-03-10 RX ADMIN — OXYCODONE HYDROCHLORIDE 5 MILLIGRAM(S): 5 TABLET ORAL at 00:20

## 2023-03-10 RX ADMIN — OXYCODONE HYDROCHLORIDE 10 MILLIGRAM(S): 5 TABLET ORAL at 09:18

## 2023-03-10 RX ADMIN — Medication 975 MILLIGRAM(S): at 06:53

## 2023-03-10 RX ADMIN — ENOXAPARIN SODIUM 40 MILLIGRAM(S): 100 INJECTION SUBCUTANEOUS at 05:23

## 2023-03-10 RX ADMIN — Medication 975 MILLIGRAM(S): at 05:22

## 2023-03-10 RX ADMIN — Medication 975 MILLIGRAM(S): at 00:20

## 2023-03-10 NOTE — DISCHARGE NOTE NURSING/CASE MANAGEMENT/SOCIAL WORK - NSSCNAMETXT_GEN_ALL_CORE
[facility] [phone] Nurse to visit on the day following discharge. Other appropriate services to be arranged thereafter. Please contact the home care agency at the above phone number if you have not heard from them by approximately 12 noon on the day after your hospital discharge.

## 2023-03-10 NOTE — DIETITIAN INITIAL EVALUATION ADULT - NSFNSGIIOFT_GEN_A_CORE
03-09-23 @ 07:01  -  03-10-23 @ 07:00  --------------------------------------------------------  OUT:    Colostomy (mL): 420 mL  Total OUT: 420 mL    Total NET: -420 mL

## 2023-03-10 NOTE — ADVANCED PRACTICE NURSE CONSULT - ASSESSMENT
Patient AxOx4, ready and willing to learn. Patient agreeable to participate in pouch change. Overview of surgical procedure and need of ostomy reinforced. Terms defined utilized: Ostomy, colostomy, wafer, pouch. Frequency of pouch change and emptying discussed, teach back method utilized. Stool consistency with colostomy reviewed. Patient shown how to open, empty and close pouch and able to demonstrate with teach back method. Patient removed colostomy pouch using pull and push technique, cleansed skin with water, patted dry. Emotional support and encouragement provided. Taught patient how to properly assess stoma viability and peristomal skin. Patient shown and performed with how to perform stoma measurement, creating template, cutting wafer, applying barrier ring (to protect peristomal skin from enzymatic irritation), and applying pouch. Reviewed s/s to report to MD. Importance of hydration reinforced.      Stoma size: 1 1/2"(Top to bottom) x 2" (side to side). See A&I flowsheet for full assessment details.

## 2023-03-10 NOTE — DIETITIAN INITIAL EVALUATION ADULT - PERTINENT MEDS FT
MEDICATIONS  (STANDING):  acetaminophen     Tablet .. 975 milliGRAM(s) Oral every 6 hours  buPROPion XL (24-Hour) . 150 milliGRAM(s) Oral daily  enoxaparin Injectable 40 milliGRAM(s) SubCutaneous every 24 hours  magnesium hydroxide Suspension 30 milliLiter(s) Oral every 24 hours    MEDICATIONS  (PRN):  oxyCODONE    IR 5 milliGRAM(s) Oral every 4 hours PRN Moderate Pain (4 - 6)  oxyCODONE    IR 10 milliGRAM(s) Oral every 4 hours PRN Severe Pain (7 - 10)

## 2023-03-10 NOTE — PROGRESS NOTE ADULT - NS ATTEND AMEND GEN_ALL_CORE FT
Pt seen/examined, overall doing well. Stoma functioning.    - d/c drain  - d/c home on PO Bactrim DS/7d  - ff/u with Dr. Lr 7-10d
Pt seen/examined, agree with above.    - adv diet  - dressing changed  - stoma care  - f/u OR cultures  - cont IV antibx

## 2023-03-10 NOTE — PROGRESS NOTE ADULT - SUBJECTIVE AND OBJECTIVE BOX
SURGERY DAILY PROGRESS NOTE:     SUBJECTIVE/ROS: Patient seen at bedside this AM. Doing well, denies n/v, tolerating diet, +air in ostomy w/o stool       OBJECTIVE:  Vital Signs Last 24 Hrs  T(C): 36.4 (03-10-23 @ 05:26), Max: 36.9 (03-09-23 @ 21:40)  HR: 75 (03-10-23 @ 05:26) (75 - 98)  BP: 109/70 (03-10-23 @ 05:26) (102/62 - 130/67)  BP(mean): --  ABP: --  ABP(mean): --  RR: 18 (03-10-23 @ 05:26) (16 - 18)  SpO2: 99% (03-10-23 @ 05:26) (98% - 100%)  Wt(kg): --  CVP(mm Hg): --  CI: --  CAPILLARY BLOOD GLUCOSE       N/A      03-09 @ 07:01  -  03-10 @ 07:00  --------------------------------------------------------  IN:    Oral Fluid: 240 mL  Total IN: 240 mL    OUT:    Bulb (mL): 11 mL    Colostomy (mL): 420 mL    Voided (mL): 800 mL  Total OUT: 1231 mL    Total NET: -991 mL      PHYSICAL EXAM:  Gen: AAOx3, non-toxic  Head: NCAT  HEENT: EOMI, oral mucosa moist, normal conjunctiva  Lung: Breathing on RA, unlabored   Abd: soft, NTND, no guarding. Ostomy w/air  MSK: no visible deformities  Neuro: No focal sensory or motor deficits    LABS  CBC (03-10 @ 05:21)                              9.9<L>                         5.18    )----------------(  362        --    % Neutrophils, --    % Lymphocytes, ANC: --                                  31.9<L>  CBC (03-09 @ 05:25)                              10.1<L>                         5.04    )----------------(  300        --    % Neutrophils, --    % Lymphocytes, ANC: --                                  31.6<L>    BMP (03-10 @ 05:21)             138     |  103     |  10    		Ca++ --      Ca 9.0                ---------------------------------( 84    		Mg 2.20               3.9     |  28      |  0.62  			Ph 3.7     BMP (03-09 @ 05:25)             139     |  102     |  7     		Ca++ --      Ca 9.0                ---------------------------------( 85    		Mg 2.20               3.9     |  27      |  0.62  			Ph 4.1       -> .Body Fluid PERITONEAL FLUID Culture (03-02 @ 23:31)       Few polymorphonuclear leukocytes seen  per oil power field  No organisms seen per oil power field    Morganella morganii  Escherichia coli    Few Morganella morganii  Rare Escherichia coli        Assessment and Plan:   · Assessment	  57 year old woman w/no sig PmHx s/p Ladonna's Procedure for perforated diverticulitis (3/4) recovering well on the floors.      PLAN:  - LRD   - AORBF  - Activity: encourage Out of bed, IS  - Pain control  - VTE prophylaxis with Heparin subcutaneous    A Team Surgery [id number]

## 2023-03-10 NOTE — DIETITIAN INITIAL EVALUATION ADULT - OTHER INFO
Patient w/ good appetite and PO intake at this time. Tolerating diet well. Patient denies any nausea/vomiting/diarrhea/constipation or difficulty chewing and swallowing. NKFA. Weight stable. RD provided colostomy and low fiber nutrition therapy. Written instruction provided. Planned d/c today.

## 2023-03-10 NOTE — PROGRESS NOTE ADULT - PROVIDER SPECIALTY LIST ADULT
Colorectal Surgery
Colorectal Surgery
Surgery
Pain Medicine
Surgery
Colorectal Surgery
Pain Medicine
Surgery
Colorectal Surgery

## 2023-03-10 NOTE — ADVANCED PRACTICE NURSE CONSULT - RECOMMEDATIONS
Supplies utilized:   Liquid barrier film    Skin barrier ring- item # [id number]  One piece drainable pouch- item #[id number]      Ostomy team will continue to follow.  Please contact Wound/Ostomy Care Service Line if we can be of further assistance (ext [phone]).

## 2023-03-10 NOTE — PROGRESS NOTE ADULT - REASON FOR ADMISSION
diverticulitis

## 2023-03-10 NOTE — DISCHARGE NOTE NURSING/CASE MANAGEMENT/SOCIAL WORK - NSDCPEFALRISK_GEN_ALL_CORE
For information on Fall & Injury Prevention, visit: [email or web address] OR  [email or web address] OR  https://www.cdc.gov/steadi/patient.html

## 2023-03-10 NOTE — ADVANCED PRACTICE NURSE CONSULT - REASON FOR CONSULT
As per H&P and Progress notes, patient is a 57F hx of diverticulitis with 1 day of severe lower abdominal pain and outpatient CT suggestive of perforated diverticulitis s/p Ladonna 3/3. Patient seen for ostomy teaching.
As per H&P and Progress notes, patient is a 57F hx of diverticulitis with 1 day of severe lower abdominal pain and outpatient CT suggestive of perforated diverticulitis s/p Ladonna 3/3. Patient seen for initial ostomy teaching.   
As per H&P and Progress notes, patient is a 57F hx of diverticulitis with 1 day of severe lower abdominal pain and outpatient CT suggestive of perforated diverticulitis s/p Ladonna 3/3. Patient seen for ostomy teaching.

## 2023-03-10 NOTE — DIETITIAN INITIAL EVALUATION ADULT - REASON FOR ADMISSION
Nontraumatic perforation of intestine    Per chart review, 57 year old woman w/no sig PmHx s/p Ladonna's Procedure for perforated diverticulitis (3/4) recovering well on the floors.

## 2023-03-10 NOTE — DIETITIAN INITIAL EVALUATION ADULT - PERTINENT LABORATORY DATA
03-10    138  |  103  |  10  ----------------------------<  84  3.9   |  28  |  0.62    Ca    9.0      10 Mar 2023 05:21  Phos  3.7     03-10  Mg     2.20     03-10

## 2023-03-10 NOTE — DISCHARGE NOTE NURSING/CASE MANAGEMENT/SOCIAL WORK - PATIENT PORTAL LINK FT
You can access the FollowMyHealth Patient Portal offered by [facility] by registering at the following website: [email or web address]. By joining [facility] FollowMyHealth portal, you will also be able to view your health information using other applications (apps) compatible with our system.

## 2023-03-14 LAB — SURGICAL PATHOLOGY STUDY: SIGNIFICANT CHANGE UP

## 2023-04-01 LAB
CULTURE RESULTS: SIGNIFICANT CHANGE UP
SPECIMEN SOURCE: SIGNIFICANT CHANGE UP

## 2023-07-05 PROBLEM — I10 ESSENTIAL (PRIMARY) HYPERTENSION: Chronic | Status: ACTIVE | Noted: 2023-03-02

## 2023-07-09 RX ADMIN — Medication 15 MILLIGRAM(S): at 19:07

## 2023-07-11 ENCOUNTER — OUTPATIENT (OUTPATIENT)
Dept: OUTPATIENT SERVICES | Facility: HOSPITAL | Age: 58
LOS: 1 days | End: 2023-07-11
Payer: COMMERCIAL

## 2023-07-11 VITALS
DIASTOLIC BLOOD PRESSURE: 90 MMHG | HEART RATE: 82 BPM | OXYGEN SATURATION: 100 % | HEIGHT: 63 IN | RESPIRATION RATE: 16 BRPM | WEIGHT: 125 LBS | SYSTOLIC BLOOD PRESSURE: 154 MMHG | TEMPERATURE: 98 F

## 2023-07-11 DIAGNOSIS — T78.40XS ALLERGY, UNSPECIFIED, SEQUELA: ICD-10-CM

## 2023-07-11 DIAGNOSIS — Z98.890 OTHER SPECIFIED POSTPROCEDURAL STATES: Chronic | ICD-10-CM

## 2023-07-11 DIAGNOSIS — Z43.3 ENCOUNTER FOR ATTENTION TO COLOSTOMY: ICD-10-CM

## 2023-07-11 DIAGNOSIS — I10 ESSENTIAL (PRIMARY) HYPERTENSION: ICD-10-CM

## 2023-07-11 DIAGNOSIS — Z93.3 COLOSTOMY STATUS: Chronic | ICD-10-CM

## 2023-07-11 DIAGNOSIS — F41.9 ANXIETY DISORDER, UNSPECIFIED: ICD-10-CM

## 2023-07-11 DIAGNOSIS — Z86.59 PERSONAL HISTORY OF OTHER MENTAL AND BEHAVIORAL DISORDERS: ICD-10-CM

## 2023-07-11 LAB
A1C WITH ESTIMATED AVERAGE GLUCOSE RESULT: 5 % — SIGNIFICANT CHANGE UP (ref 4–5.6)
ANION GAP SERPL CALC-SCNC: 16 MMOL/L — HIGH (ref 7–14)
BLD GP AB SCN SERPL QL: NEGATIVE — SIGNIFICANT CHANGE UP
BUN SERPL-MCNC: 26 MG/DL — HIGH (ref 7–23)
CALCIUM SERPL-MCNC: 9.8 MG/DL — SIGNIFICANT CHANGE UP (ref 8.4–10.5)
CHLORIDE SERPL-SCNC: 97 MMOL/L — LOW (ref 98–107)
CO2 SERPL-SCNC: 26 MMOL/L — SIGNIFICANT CHANGE UP (ref 22–31)
CREAT SERPL-MCNC: 0.7 MG/DL — SIGNIFICANT CHANGE UP (ref 0.5–1.3)
EGFR: 102 ML/MIN/1.73M2 — SIGNIFICANT CHANGE UP
ESTIMATED AVERAGE GLUCOSE: 97 — SIGNIFICANT CHANGE UP
GLUCOSE SERPL-MCNC: 82 MG/DL — SIGNIFICANT CHANGE UP (ref 70–99)
HCT VFR BLD CALC: 40.1 % — SIGNIFICANT CHANGE UP (ref 34.5–45)
HGB BLD-MCNC: 13.3 G/DL — SIGNIFICANT CHANGE UP (ref 11.5–15.5)
MCHC RBC-ENTMCNC: 32.4 PG — SIGNIFICANT CHANGE UP (ref 27–34)
MCHC RBC-ENTMCNC: 33.2 GM/DL — SIGNIFICANT CHANGE UP (ref 32–36)
MCV RBC AUTO: 97.6 FL — SIGNIFICANT CHANGE UP (ref 80–100)
NRBC # BLD: 0 /100 WBCS — SIGNIFICANT CHANGE UP (ref 0–0)
NRBC # FLD: 0 K/UL — SIGNIFICANT CHANGE UP (ref 0–0)
PLATELET # BLD AUTO: 209 K/UL — SIGNIFICANT CHANGE UP (ref 150–400)
POTASSIUM SERPL-MCNC: 3.1 MMOL/L — LOW (ref 3.5–5.3)
POTASSIUM SERPL-SCNC: 3.1 MMOL/L — LOW (ref 3.5–5.3)
RBC # BLD: 4.11 M/UL — SIGNIFICANT CHANGE UP (ref 3.8–5.2)
RBC # FLD: 12.6 % — SIGNIFICANT CHANGE UP (ref 10.3–14.5)
RH IG SCN BLD-IMP: POSITIVE — SIGNIFICANT CHANGE UP
SODIUM SERPL-SCNC: 139 MMOL/L — SIGNIFICANT CHANGE UP (ref 135–145)
WBC # BLD: 6.07 K/UL — SIGNIFICANT CHANGE UP (ref 3.8–10.5)
WBC # FLD AUTO: 6.07 K/UL — SIGNIFICANT CHANGE UP (ref 3.8–10.5)

## 2023-07-11 PROCEDURE — 93010 ELECTROCARDIOGRAM REPORT: CPT

## 2023-07-11 RX ORDER — LISDEXAMFETAMINE DIMESYLATE 70 MG/1
0 CAPSULE ORAL
Qty: 0 | Refills: 0 | DISCHARGE

## 2023-07-11 RX ORDER — SODIUM CHLORIDE 9 MG/ML
1000 INJECTION, SOLUTION INTRAVENOUS
Refills: 0 | Status: DISCONTINUED | OUTPATIENT
Start: 2023-07-17 | End: 2023-07-17

## 2023-07-11 RX ORDER — BUPROPION HYDROCHLORIDE 150 MG/1
1 TABLET, EXTENDED RELEASE ORAL
Qty: 0 | Refills: 0 | DISCHARGE

## 2023-07-11 RX ORDER — FUROSEMIDE 40 MG
1 TABLET ORAL
Qty: 0 | Refills: 0 | DISCHARGE

## 2023-07-11 NOTE — H&P PST ADULT - HISTORY OF PRESENT ILLNESS
This is a 57 y.o. female s/p colostomy with colon resection secondary to diverticulitis 3/2/23 . Pt had recent colonoscopy 7/23 . Pt is scheduled for surgery 7/17/23.

## 2023-07-11 NOTE — H&P PST ADULT - PRO ARRIVE FROM
Sepsis Evaluation Progress Note    I was called to see Melquiades Morales due to abnormal vital signs triggering the Sepsis SIRS screening alert. He is known to have an infection.     Physical Exam   Vital Signs:  Temp: 97.2  F (36.2  C) Temp src: Oral BP: (!) 154/76 Pulse: 105   Resp: 20 SpO2: 92 % O2 Device: Nasal cannula Oxygen Delivery: 3 LPM     General: in no acute distress  Mental Status: baseline mental status.     Remainder of physical exam is significant for no change    Data   Lactic Acid   Date Value Ref Range Status   03/22/2021 1.4 0.7 - 2.0 mmol/L Final   08/05/2020 1.0 0.7 - 2.0 mmol/L Final     Lactate for Sepsis Protocol   Date Value Ref Range Status   03/23/2021 3.8 (H) 0.7 - 2.0 mmol/L Final     Comment:     Significant value called to and read back by  ROMA SINGH RN MEDSURG 1800 03/23/2021 KT     09/27/2019 1.0 0.7 - 2.0 mmol/L Final       Assessment & Plan   Melquiades Morales meets SIRS criteria but does NOT have a lactate >2 or other evidence of acute organ damage.  These vital sign, lab and physical exam findings are consistent with SEPSIS.    Sepsis Time-Zero (time Sepsis diagnosis confirmed): no sepsis definatively documented    Anti-infectives (From now, onward)    Start     Dose/Rate Route Frequency Ordered Stop    03/23/21 1800  azithromycin (ZITHROMAX) 500 mg in sodium chloride 0.9 % 250 mL intermittent infusion      500 mg  over 1 Hours Intravenous EVERY 24 HOURS 03/22/21 1946 03/23/21 1700  cefTRIAXone in d5w (ROCEPHIN) intermittent infusion 1 g      1 g  over 30 Minutes Intravenous EVERY 24 HOURS 03/22/21 1946          Current antibiotic coverage is appropriate for source of infection.     Disposition: The patient will remain on the current unit. We will continue to monitor this patient closely..  Rey Clark MD, MD    Sepsis Criteria   Sepsis: 2+ SIRS criteria due to infection  Severe Sepsis: Sepsis AND 1+ new sign of acute organ dysfunction (Note: lactate >2 or acute  encephalopathy each qualify as organ dysfunction)  Septic Shock: Sepsis AND hypotension despite volume resuscitation with 30 ml/kg crystalloid or lactate >=4  Note: HYPOTENSION is defined as 2 BP readings measured 3 hrs apart that have a SBP <90, MAP <65, or decrease >40 mmHg, occurring 6 hrs before or after t-zero     home

## 2023-07-11 NOTE — H&P PST ADULT - PROBLEM SELECTOR PLAN 1
Scheduled for reversal of shine   Preop instructions provided and patient verbalizes understanding.  Labs done and results pending.  Hibiclens held due to allergy . Medical clearance for HTN , /90 pt to see pcp prior to OR . Scheduled for reversal of frida.  Preop instructions provided and patient verbalizes understanding.  Labs done and results pending.  Hibiclens held due to allergy . Medical clearance for HTN , /90 pt to see pcp prior to OR .

## 2023-07-11 NOTE — H&P PST ADULT - ALLERGY TYPES
seasonal allergies; pet dander - allergic rhinitis/outdoor environmental allergies/indoor environmental allergies

## 2023-07-11 NOTE — H&P PST ADULT - NSICDXPASTSURGICALHX_GEN_ALL_CORE_FT
PAST SURGICAL HISTORY:  H/O cervical spine surgery     S/P colostomy     Status post bilateral inguinal hernia repair
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
## 2023-07-11 NOTE — H&P PST ADULT - NS PRO AD PATIENT TYPE
Prescription approved per [facility] Refill Protocol.  Future Appointments 3/7/2023 - 9/3/2023      Date Visit Type Length Department Provider     3/10/2023  8:00 AM P NEUROPSYCH EVAL 240 min [facility] NEUROPSYCHOLOGY Yomi Agustin, PhD    Location Instructions:     Located in the Clinics and Surgery Center at [address] [facility] [address]. For parking options, enter the [facility]arrival plaza from [facility] and attendants can assist you based on your needs.  parking is available for those with limited mobility M-F from 7 a.m. to 5 p.m. Due to short staffing, we are unable to offer  to all patients/visitors. Visit [email or web address] for more details.  Self-parking:&nbsp;  West Lot: Located across from the main entrance, this is a convenient option for patients. Enter on [facility]. Parking attendants available most hours to assist.&nbsp;     [facility] Ramp: Enter at the [facility] entrance (one block north of the [facility] main entrance). Do not enter the ramp from [facility] - this entrance is not staffed and is further from the [facility] main entrance.              3/21/2023  7:30 AM [facility] PREVENTATIVE ADULT VISIT 30 min [facility] Polly Mcbride MD    Location Instructions:     [facility] is in [address] of the [facility] at [address] in [place]. The building is at the intersection with [facility] and along [facility]. This is the large, blue, glass building with a sculpture on the roof; please note there is no [place] signage on the exterior. Free lot parking is available.                 Nithin SCHWARTZ RN    
Health Care Proxy (HCP)

## 2023-07-11 NOTE — H&P PST ADULT - FUNCTIONAL STATUS
walks 6-8 miles 3 times weekly , walks 1-2 miles 4 times weekly , ADL's , stairs 3-4 flights daily ; METS 6 ; denies any cardiac complaints/4-10 METS

## 2023-07-11 NOTE — H&P PST ADULT - PROBLEM SELECTOR PLAN 3
multiple including cipro , iodine , shellfish , BAKBENZYLKONIUM CHLORIDE , Recommend allergy alert bracelet on admit . Hibiclens held .

## 2023-07-14 ENCOUNTER — OUTPATIENT (OUTPATIENT)
Dept: OUTPATIENT SERVICES | Facility: HOSPITAL | Age: 58
LOS: 1 days | End: 2023-07-14

## 2023-07-14 DIAGNOSIS — Z98.890 OTHER SPECIFIED POSTPROCEDURAL STATES: Chronic | ICD-10-CM

## 2023-07-14 DIAGNOSIS — Z93.3 COLOSTOMY STATUS: Chronic | ICD-10-CM

## 2023-07-14 DIAGNOSIS — Z43.3 ENCOUNTER FOR ATTENTION TO COLOSTOMY: ICD-10-CM

## 2023-07-14 PROBLEM — K57.92 DIVERTICULITIS OF INTESTINE, PART UNSPECIFIED, WITHOUT PERFORATION OR ABSCESS WITHOUT BLEEDING: Chronic | Status: ACTIVE | Noted: 2023-07-11

## 2023-07-14 PROBLEM — R01.1 CARDIAC MURMUR, UNSPECIFIED: Chronic | Status: ACTIVE | Noted: 2023-07-11

## 2023-07-14 PROBLEM — F90.9 ATTENTION-DEFICIT HYPERACTIVITY DISORDER, UNSPECIFIED TYPE: Chronic | Status: ACTIVE | Noted: 2023-07-11

## 2023-07-14 LAB
HEMOLYSIS INDEX: 2 — SIGNIFICANT CHANGE UP
POTASSIUM SERPL-MCNC: 4 MMOL/L — SIGNIFICANT CHANGE UP (ref 3.5–5.3)
POTASSIUM SERPL-SCNC: 4 MMOL/L — SIGNIFICANT CHANGE UP (ref 3.5–5.3)

## 2023-07-16 ENCOUNTER — TRANSCRIPTION ENCOUNTER (OUTPATIENT)
Age: 58
End: 2023-07-16

## 2023-07-17 ENCOUNTER — INPATIENT (INPATIENT)
Facility: HOSPITAL | Age: 58
LOS: 4 days | Discharge: HOME | End: 2023-07-22
Attending: SURGERY | Admitting: SURGERY
Payer: COMMERCIAL

## 2023-07-17 VITALS
OXYGEN SATURATION: 100 % | TEMPERATURE: 98 F | DIASTOLIC BLOOD PRESSURE: 73 MMHG | RESPIRATION RATE: 16 BRPM | HEIGHT: 63 IN | SYSTOLIC BLOOD PRESSURE: 109 MMHG | HEART RATE: 82 BPM | WEIGHT: 125 LBS

## 2023-07-17 DIAGNOSIS — Z43.3 ENCOUNTER FOR ATTENTION TO COLOSTOMY: ICD-10-CM

## 2023-07-17 DIAGNOSIS — Z93.3 COLOSTOMY STATUS: Chronic | ICD-10-CM

## 2023-07-17 DIAGNOSIS — Z98.890 OTHER SPECIFIED POSTPROCEDURAL STATES: Chronic | ICD-10-CM

## 2023-07-17 LAB
ANION GAP SERPL CALC-SCNC: 17 MMOL/L — HIGH (ref 7–14)
BASE EXCESS BLDV CALC-SCNC: 3.4 MMOL/L — HIGH (ref -2–3)
BUN SERPL-MCNC: 15 MG/DL — SIGNIFICANT CHANGE UP (ref 7–23)
CA-I SERPL-SCNC: 1.08 MMOL/L — LOW (ref 1.15–1.33)
CALCIUM SERPL-MCNC: 8.5 MG/DL — SIGNIFICANT CHANGE UP (ref 8.4–10.5)
CHLORIDE BLDV-SCNC: 97 MMOL/L — SIGNIFICANT CHANGE UP (ref 96–108)
CHLORIDE SERPL-SCNC: 97 MMOL/L — LOW (ref 98–107)
CO2 BLDV-SCNC: 31.7 MMOL/L — HIGH (ref 22–26)
CO2 SERPL-SCNC: 25 MMOL/L — SIGNIFICANT CHANGE UP (ref 22–31)
CREAT SERPL-MCNC: 0.74 MG/DL — SIGNIFICANT CHANGE UP (ref 0.5–1.3)
EGFR: 95 ML/MIN/1.73M2 — SIGNIFICANT CHANGE UP
GAS PNL BLDV: 132 MMOL/L — LOW (ref 136–145)
GAS PNL BLDV: SIGNIFICANT CHANGE UP
GLUCOSE BLDV-MCNC: 148 MG/DL — HIGH (ref 70–99)
GLUCOSE SERPL-MCNC: 142 MG/DL — HIGH (ref 70–99)
HCO3 BLDV-SCNC: 30 MMOL/L — HIGH (ref 22–29)
HCT VFR BLD CALC: 36.9 % — SIGNIFICANT CHANGE UP (ref 34.5–45)
HCT VFR BLDA CALC: 47 % — HIGH (ref 34.5–46.5)
HGB BLD CALC-MCNC: 15.8 G/DL — SIGNIFICANT CHANGE UP (ref 11.7–16.1)
HGB BLD-MCNC: 12.1 G/DL — SIGNIFICANT CHANGE UP (ref 11.5–15.5)
LACTATE BLDV-MCNC: 1.8 MMOL/L — SIGNIFICANT CHANGE UP (ref 0.5–2)
LACTATE BLDV-MCNC: 1.8 MMOL/L — SIGNIFICANT CHANGE UP (ref 0.5–2)
LACTATE SERPL-SCNC: 1.7 MMOL/L — SIGNIFICANT CHANGE UP (ref 0.5–2)
MAGNESIUM SERPL-MCNC: 2 MG/DL — SIGNIFICANT CHANGE UP (ref 1.6–2.6)
MCHC RBC-ENTMCNC: 32.3 PG — SIGNIFICANT CHANGE UP (ref 27–34)
MCHC RBC-ENTMCNC: 32.8 GM/DL — SIGNIFICANT CHANGE UP (ref 32–36)
MCV RBC AUTO: 98.4 FL — SIGNIFICANT CHANGE UP (ref 80–100)
NRBC # BLD: 0 /100 WBCS — SIGNIFICANT CHANGE UP (ref 0–0)
NRBC # FLD: 0 K/UL — SIGNIFICANT CHANGE UP (ref 0–0)
PCO2 BLDV: 52 MMHG — SIGNIFICANT CHANGE UP (ref 39–52)
PH BLDV: 7.37 — SIGNIFICANT CHANGE UP (ref 7.32–7.43)
PHOSPHATE SERPL-MCNC: 4.3 MG/DL — SIGNIFICANT CHANGE UP (ref 2.5–4.5)
PLATELET # BLD AUTO: 176 K/UL — SIGNIFICANT CHANGE UP (ref 150–400)
PO2 BLDV: 44 MMHG — SIGNIFICANT CHANGE UP (ref 25–45)
POTASSIUM BLDV-SCNC: 3.1 MMOL/L — LOW (ref 3.5–5.1)
POTASSIUM BLDV-SCNC: 3.6 MMOL/L — SIGNIFICANT CHANGE UP (ref 3.5–5.1)
POTASSIUM SERPL-MCNC: 3.1 MMOL/L — LOW (ref 3.5–5.3)
POTASSIUM SERPL-SCNC: 3.1 MMOL/L — LOW (ref 3.5–5.3)
RBC # BLD: 3.75 M/UL — LOW (ref 3.8–5.2)
RBC # FLD: 12.4 % — SIGNIFICANT CHANGE UP (ref 10.3–14.5)
RH IG SCN BLD-IMP: POSITIVE — SIGNIFICANT CHANGE UP
SAO2 % BLDV: 68.1 % — SIGNIFICANT CHANGE UP (ref 67–88)
SODIUM SERPL-SCNC: 139 MMOL/L — SIGNIFICANT CHANGE UP (ref 135–145)
WBC # BLD: 10.01 K/UL — SIGNIFICANT CHANGE UP (ref 3.8–10.5)
WBC # FLD AUTO: 10.01 K/UL — SIGNIFICANT CHANGE UP (ref 3.8–10.5)

## 2023-07-17 PROCEDURE — 88305 TISSUE EXAM BY PATHOLOGIST: CPT | Mod: 26

## 2023-07-17 PROCEDURE — 88304 TISSUE EXAM BY PATHOLOGIST: CPT | Mod: 26

## 2023-07-17 DEVICE — STAPLER COVIDIEN TA 60 BLUE: Type: IMPLANTABLE DEVICE | Status: FUNCTIONAL

## 2023-07-17 DEVICE — STAPLER COVIDIEN ENDO GIA 80-3.8MM BLUE: Type: IMPLANTABLE DEVICE | Status: FUNCTIONAL

## 2023-07-17 DEVICE — STAPLER COVIDIEN TA 60 BLUE RELOAD: Type: IMPLANTABLE DEVICE | Status: FUNCTIONAL

## 2023-07-17 DEVICE — STAPLER ETHICON CIRCULAR XL 29MM: Type: IMPLANTABLE DEVICE | Status: FUNCTIONAL

## 2023-07-17 DEVICE — STAPLER COVIDIEN ENDO GIA 80-3.8MM BLUE RELOAD: Type: IMPLANTABLE DEVICE | Status: FUNCTIONAL

## 2023-07-17 DEVICE — SURGICEL 2 X 14": Type: IMPLANTABLE DEVICE | Status: FUNCTIONAL

## 2023-07-17 RX ORDER — HYDROMORPHONE HYDROCHLORIDE 2 MG/ML
0.5 INJECTION INTRAMUSCULAR; INTRAVENOUS; SUBCUTANEOUS
Refills: 0 | Status: DISCONTINUED | OUTPATIENT
Start: 2023-07-17 | End: 2023-07-17

## 2023-07-17 RX ORDER — ACETAMINOPHEN 500 MG
1000 TABLET ORAL EVERY 6 HOURS
Refills: 0 | Status: COMPLETED | OUTPATIENT
Start: 2023-07-17 | End: 2023-07-18

## 2023-07-17 RX ORDER — ONDANSETRON 8 MG/1
4 TABLET, FILM COATED ORAL EVERY 6 HOURS
Refills: 0 | Status: DISCONTINUED | OUTPATIENT
Start: 2023-07-17 | End: 2023-07-22

## 2023-07-17 RX ORDER — ONDANSETRON 8 MG/1
4 TABLET, FILM COATED ORAL ONCE
Refills: 0 | Status: DISCONTINUED | OUTPATIENT
Start: 2023-07-17 | End: 2023-07-17

## 2023-07-17 RX ORDER — BUPROPION HYDROCHLORIDE 150 MG/1
1 TABLET, EXTENDED RELEASE ORAL
Refills: 0 | DISCHARGE

## 2023-07-17 RX ORDER — LISDEXAMFETAMINE DIMESYLATE 70 MG/1
1 CAPSULE ORAL
Refills: 0 | DISCHARGE

## 2023-07-17 RX ORDER — HYDROMORPHONE HYDROCHLORIDE 2 MG/ML
30 INJECTION INTRAMUSCULAR; INTRAVENOUS; SUBCUTANEOUS
Refills: 0 | Status: DISCONTINUED | OUTPATIENT
Start: 2023-07-17 | End: 2023-07-18

## 2023-07-17 RX ORDER — OXYCODONE HYDROCHLORIDE 5 MG/1
5 TABLET ORAL ONCE
Refills: 0 | Status: DISCONTINUED | OUTPATIENT
Start: 2023-07-17 | End: 2023-07-17

## 2023-07-17 RX ORDER — LORATADINE 10 MG/1
1 TABLET ORAL
Refills: 0 | DISCHARGE

## 2023-07-17 RX ORDER — CHLORHEXIDINE GLUCONATE 213 G/1000ML
1 SOLUTION TOPICAL ONCE
Refills: 0 | Status: COMPLETED | OUTPATIENT
Start: 2023-07-17 | End: 2023-07-17

## 2023-07-17 RX ORDER — HEPARIN SODIUM 5000 [USP'U]/ML
5000 INJECTION INTRAVENOUS; SUBCUTANEOUS EVERY 8 HOURS
Refills: 0 | Status: DISCONTINUED | OUTPATIENT
Start: 2023-07-17 | End: 2023-07-22

## 2023-07-17 RX ORDER — NALOXONE HYDROCHLORIDE 4 MG/.1ML
0.1 SPRAY NASAL
Refills: 0 | Status: DISCONTINUED | OUTPATIENT
Start: 2023-07-17 | End: 2023-07-22

## 2023-07-17 RX ORDER — KETOROLAC TROMETHAMINE 30 MG/ML
15 SYRINGE (ML) INJECTION EVERY 6 HOURS
Refills: 0 | Status: DISCONTINUED | OUTPATIENT
Start: 2023-07-17 | End: 2023-07-20

## 2023-07-17 RX ORDER — HYDROMORPHONE HYDROCHLORIDE 2 MG/ML
1 INJECTION INTRAMUSCULAR; INTRAVENOUS; SUBCUTANEOUS
Refills: 0 | Status: DISCONTINUED | OUTPATIENT
Start: 2023-07-17 | End: 2023-07-17

## 2023-07-17 RX ORDER — KETOROLAC TROMETHAMINE 30 MG/ML
30 SYRINGE (ML) INJECTION EVERY 6 HOURS
Refills: 0 | Status: DISCONTINUED | OUTPATIENT
Start: 2023-07-17 | End: 2023-07-17

## 2023-07-17 RX ORDER — HYDROMORPHONE HYDROCHLORIDE 2 MG/ML
0.5 INJECTION INTRAMUSCULAR; INTRAVENOUS; SUBCUTANEOUS
Refills: 0 | Status: DISCONTINUED | OUTPATIENT
Start: 2023-07-17 | End: 2023-07-20

## 2023-07-17 RX ORDER — SODIUM CHLORIDE 9 MG/ML
1000 INJECTION, SOLUTION INTRAVENOUS
Refills: 0 | Status: DISCONTINUED | OUTPATIENT
Start: 2023-07-17 | End: 2023-07-20

## 2023-07-17 RX ADMIN — SODIUM CHLORIDE 30 MILLILITER(S): 9 INJECTION, SOLUTION INTRAVENOUS at 11:16

## 2023-07-17 RX ADMIN — Medication 15 MILLIGRAM(S): at 23:30

## 2023-07-17 RX ADMIN — Medication 1000 MILLIGRAM(S): at 21:57

## 2023-07-17 RX ADMIN — HYDROMORPHONE HYDROCHLORIDE 30 MILLILITER(S): 2 INJECTION INTRAMUSCULAR; INTRAVENOUS; SUBCUTANEOUS at 18:44

## 2023-07-17 RX ADMIN — HEPARIN SODIUM 5000 UNIT(S): 5000 INJECTION INTRAVENOUS; SUBCUTANEOUS at 21:57

## 2023-07-17 RX ADMIN — Medication 15 MILLIGRAM(S): at 23:00

## 2023-07-17 RX ADMIN — HYDROMORPHONE HYDROCHLORIDE 30 MILLILITER(S): 2 INJECTION INTRAMUSCULAR; INTRAVENOUS; SUBCUTANEOUS at 20:05

## 2023-07-17 RX ADMIN — HYDROMORPHONE HYDROCHLORIDE 0.5 MILLIGRAM(S): 2 INJECTION INTRAMUSCULAR; INTRAVENOUS; SUBCUTANEOUS at 20:15

## 2023-07-17 RX ADMIN — HYDROMORPHONE HYDROCHLORIDE 30 MILLILITER(S): 2 INJECTION INTRAMUSCULAR; INTRAVENOUS; SUBCUTANEOUS at 21:29

## 2023-07-17 RX ADMIN — Medication 400 MILLIGRAM(S): at 21:27

## 2023-07-17 NOTE — ASU PREOP CHECKLIST - PATIENT PROBLEMS/NEEDS
Jayla CHANDRA (Scrbonnie): Pt not found in bed at this time, is out for testing.
Patient expressed no known problems or needs

## 2023-07-17 NOTE — BRIEF OPERATIVE NOTE - NSICDXBRIEFPREOP_GEN_ALL_CORE_FT
PRE-OP DIAGNOSIS:  History of creation of ostomy 17-Jul-2023 17:33:22  Kerri Booth  Diverticulitis of colon with perforation 17-Jul-2023 17:34:00  Kerri Booth

## 2023-07-17 NOTE — CHART NOTE - NSCHARTNOTEFT_GEN_A_CORE
Post Operative Note  Patient: MIRANDA NEAL 57y (1965) Female   MRN: [id number]  Location: [address]  Visit: 07-17-23 Inpatient  Date: 07-17-23 @ 23:03    Procedure: S/P Reanastomosis of colon with reversal of Ladonna's pouch    Subjective: Patient seen and examined post operatively. Reports pain as controlled. Patient was sitting in bed reading at the post op exam. Denies nausea, vomiting, fever, chills, chest pain, SOB, cough. Denies passing any gas.       Objective:  Vitals: T(F): 97.6 (07-17-23 @ 20:15), Max: 98.6 (07-17-23 @ 17:20)  HR: 93 (07-17-23 @ 20:45)  BP: 121/68 (07-17-23 @ 20:45) (109/73 - 133/68)  RR: 14 (07-17-23 @ 20:45)  SpO2: 97% (07-17-23 @ 20:45)  Vent Settings:     In:   07-17-23 @ 07:01  -  07-17-23 @ 23:03  --------------------------------------------------------  IN: 150 mL      IV Fluids: lactated ringers. 1000 milliLiter(s) (150 mL/Hr) IV Continuous <Continuous>      Out:   07-17-23 @ 07:01  -  07-17-23 @ 23:03  --------------------------------------------------------  OUT: 175 mL      EBL:     Voided Urine:   07-17-23 @ 07:01  -  07-17-23 @ 23:03  --------------------------------------------------------  OUT: 175 mL      Espino Catheter:  no   Drains: YONATHAN drain    07-17-23 @ 07:01  -  07-17-23 @ 23:03  --------------------------------------------------------  OUT: 70 mL          Physical Examination:  General: NAD, resting comfortably in bed  HEENT: Normocephalic atraumatic  Respiratory: Nonlabored respirations, normal CW expansion.  Cardio: S1S2, regular rate and rhythm.  Abdomen: softly distended, appropriately tender, 1 long vertical incision covered with a dressing that is c/d/i. Yonathan drain with serosanguinous fluid, dressing is C/D/i  Vascular: extremities are warm and well perfused.     Imaging:  No post-op imaging studies    Assessment:  57yFemale patient S/P Reanastomosis of colon with reversal of Ladonna's pouch. The pain is well controlled and patient is well postoperatively.      Plan:    - Pain control PRN  - Diet: NPO with chips  - IVF LR  - Activity: as tolerated  - DVT ppx: SCD's &     Date/Time: 07-17-23 @ 23:03 Post Operative Note  Patient: MIRANDA NEAL 57y (1965) Female   MRN: [id number]  Location: [address]  Visit: 07-17-23 Inpatient  Date: 07-17-23 @ 23:03    Procedure: S/P Reanastomosis of colon with reversal of Ladonna's pouch    Subjective: Patient seen and examined post operatively. Reports pain as controlled. Patient was sitting in bed reading at the post op exam. Denies nausea, vomiting, fever, chills, chest pain, SOB, cough. Denies passing any gas.       Objective:  Vitals: T(F): 97.6 (07-17-23 @ 20:15), Max: 98.6 (07-17-23 @ 17:20)  HR: 93 (07-17-23 @ 20:45)  BP: 121/68 (07-17-23 @ 20:45) (109/73 - 133/68)  RR: 14 (07-17-23 @ 20:45)  SpO2: 97% (07-17-23 @ 20:45)      In:   07-17-23 @ 07:01  -  07-17-23 @ 23:03  --------------------------------------------------------  IN: 150 mL      IV Fluids: lactated ringers. 1000 milliLiter(s) (150 mL/Hr) IV Continuous <Continuous>      Out:   07-17-23 @ 07:01  -  07-17-23 @ 23:03  --------------------------------------------------------  OUT: 175 mL      EBL:     Voided Urine:   07-17-23 @ 07:01  -  07-17-23 @ 23:03  --------------------------------------------------------  OUT: 175 mL      Smith Catheter:  Yes  Drains: YONATHAN drain    07-17-23 @ 07:01  -  07-17-23 @ 23:03  --------------------------------------------------------  OUT: 70 mL          Physical Examination:  General: NAD, resting comfortably in bed  HEENT: Normocephalic atraumatic  Respiratory: Nonlabored respirations, normal CW expansion.  Cardio:  regular rate and rhythm.  Abdomen: softly distended, appropriately tender, 1 long vertical incision covered with a dressing that is c/d/i. Yonathan drain with serosanguinous fluid, dressing is C/D/i  Vascular: extremities are warm and well perfused.     Imaging:  No post-op imaging studies    Assessment:  57yFemale patient S/P Reanastomosis of colon with reversal of Ladonna's pouch. The pain is well controlled and patient is hemodynamically stable postoperatively.     Plan:    -monitor I/Os, smith  - Pain control PRN  - Diet: NPO with chips  - IVF LR  - Activity: as tolerated  - DVT ppx:  subq heparin

## 2023-07-17 NOTE — PATIENT PROFILE ADULT - FALL HARM RISK - HARM RISK INTERVENTIONS

## 2023-07-17 NOTE — PATIENT PROFILE ADULT - STATED REASON FOR ADMISSION
admitting dx: attention to colostomy   s/p: hartmans reversal, resection of colorectal anastamosis and incisional hernia repair

## 2023-07-17 NOTE — BRIEF OPERATIVE NOTE - OPERATION/FINDINGS
Ex lap, JASIEL, takedown of colostomy, splenic flexure already mobilized at index operation, mobilization of transverse colon, rectal stump dissected free and revised using TA stapler. descending colon anastomosed to rectum with EEA stapler, Bakers type anastomosis. Negative leak test, flex sig with intact, hemostatic anastomosis.

## 2023-07-17 NOTE — BRIEF OPERATIVE NOTE - NSICDXBRIEFPROCEDURE_GEN_ALL_CORE_FT
PROCEDURES:  Reanastomosis of colon with reversal of Ladonna's pouch 17-Jul-2023 17:33:12  Kerri Booth

## 2023-07-17 NOTE — PATIENT PROFILE ADULT - TRANSPORTATION
Patient presents stating he's been sick since easter. Developed a fever, with n/v/d. Had some CP and palpitations as well. Tested negative for Covid at [facility], but has never improved. Was seen here yesterday with GI complaints. Work-up negative, but patient now c/o CP and heart racing. When pressed, states that his symptoms arre a continuation of what he's had, and are not new today. No cough or SOB now. No fever for at least a week    No known contacts or travel. Uses [facility], but has not since getting sick. Denies other drugs
no

## 2023-07-17 NOTE — ASU PREOP CHECKLIST - 1.
patient unable to use chlorhexidine due to allergy patient unable to use chlorhexidine due to allergy, MD made aware

## 2023-07-18 ENCOUNTER — TRANSCRIPTION ENCOUNTER (OUTPATIENT)
Age: 58
End: 2023-07-18

## 2023-07-18 LAB
ANION GAP SERPL CALC-SCNC: 12 MMOL/L — SIGNIFICANT CHANGE UP (ref 7–14)
BUN SERPL-MCNC: 18 MG/DL — SIGNIFICANT CHANGE UP (ref 7–23)
CALCIUM SERPL-MCNC: 8.5 MG/DL — SIGNIFICANT CHANGE UP (ref 8.4–10.5)
CHLORIDE SERPL-SCNC: 99 MMOL/L — SIGNIFICANT CHANGE UP (ref 98–107)
CO2 SERPL-SCNC: 28 MMOL/L — SIGNIFICANT CHANGE UP (ref 22–31)
CREAT SERPL-MCNC: 0.66 MG/DL — SIGNIFICANT CHANGE UP (ref 0.5–1.3)
EGFR: 104 ML/MIN/1.73M2 — SIGNIFICANT CHANGE UP
GLUCOSE SERPL-MCNC: 114 MG/DL — HIGH (ref 70–99)
HCT VFR BLD CALC: 34.5 % — SIGNIFICANT CHANGE UP (ref 34.5–45)
HGB BLD-MCNC: 11.3 G/DL — LOW (ref 11.5–15.5)
MAGNESIUM SERPL-MCNC: 2.1 MG/DL — SIGNIFICANT CHANGE UP (ref 1.6–2.6)
MCHC RBC-ENTMCNC: 31.3 PG — SIGNIFICANT CHANGE UP (ref 27–34)
MCHC RBC-ENTMCNC: 32.8 GM/DL — SIGNIFICANT CHANGE UP (ref 32–36)
MCV RBC AUTO: 95.6 FL — SIGNIFICANT CHANGE UP (ref 80–100)
NRBC # BLD: 0 /100 WBCS — SIGNIFICANT CHANGE UP (ref 0–0)
NRBC # FLD: 0 K/UL — SIGNIFICANT CHANGE UP (ref 0–0)
PHOSPHATE SERPL-MCNC: 4.4 MG/DL — SIGNIFICANT CHANGE UP (ref 2.5–4.5)
PLATELET # BLD AUTO: 185 K/UL — SIGNIFICANT CHANGE UP (ref 150–400)
POTASSIUM SERPL-MCNC: 3.9 MMOL/L — SIGNIFICANT CHANGE UP (ref 3.5–5.3)
POTASSIUM SERPL-SCNC: 3.9 MMOL/L — SIGNIFICANT CHANGE UP (ref 3.5–5.3)
RBC # BLD: 3.61 M/UL — LOW (ref 3.8–5.2)
RBC # FLD: 13 % — SIGNIFICANT CHANGE UP (ref 10.3–14.5)
SODIUM SERPL-SCNC: 139 MMOL/L — SIGNIFICANT CHANGE UP (ref 135–145)
WBC # BLD: 9.15 K/UL — SIGNIFICANT CHANGE UP (ref 3.8–10.5)
WBC # FLD AUTO: 9.15 K/UL — SIGNIFICANT CHANGE UP (ref 3.8–10.5)

## 2023-07-18 RX ORDER — PIPERACILLIN AND TAZOBACTAM 4; .5 G/20ML; G/20ML
3.38 INJECTION, POWDER, LYOPHILIZED, FOR SOLUTION INTRAVENOUS EVERY 8 HOURS
Refills: 0 | Status: DISCONTINUED | OUTPATIENT
Start: 2023-07-18 | End: 2023-07-19

## 2023-07-18 RX ORDER — PIPERACILLIN AND TAZOBACTAM 4; .5 G/20ML; G/20ML
3.38 INJECTION, POWDER, LYOPHILIZED, FOR SOLUTION INTRAVENOUS ONCE
Refills: 0 | Status: COMPLETED | OUTPATIENT
Start: 2023-07-18 | End: 2023-07-18

## 2023-07-18 RX ORDER — ACETAMINOPHEN 500 MG
1000 TABLET ORAL EVERY 6 HOURS
Refills: 0 | Status: COMPLETED | OUTPATIENT
Start: 2023-07-19 | End: 2023-07-19

## 2023-07-18 RX ORDER — HYDROMORPHONE HYDROCHLORIDE 2 MG/ML
30 INJECTION INTRAMUSCULAR; INTRAVENOUS; SUBCUTANEOUS
Refills: 0 | Status: DISCONTINUED | OUTPATIENT
Start: 2023-07-18 | End: 2023-07-20

## 2023-07-18 RX ORDER — POTASSIUM CHLORIDE 20 MEQ
10 PACKET (EA) ORAL
Refills: 0 | Status: COMPLETED | OUTPATIENT
Start: 2023-07-18 | End: 2023-07-18

## 2023-07-18 RX ORDER — SODIUM CHLORIDE 9 MG/ML
1000 INJECTION, SOLUTION INTRAVENOUS ONCE
Refills: 0 | Status: COMPLETED | OUTPATIENT
Start: 2023-07-18 | End: 2023-07-18

## 2023-07-18 RX ADMIN — Medication 100 MILLIEQUIVALENT(S): at 07:32

## 2023-07-18 RX ADMIN — PIPERACILLIN AND TAZOBACTAM 200 GRAM(S): 4; .5 INJECTION, POWDER, LYOPHILIZED, FOR SOLUTION INTRAVENOUS at 06:17

## 2023-07-18 RX ADMIN — PIPERACILLIN AND TAZOBACTAM 25 GRAM(S): 4; .5 INJECTION, POWDER, LYOPHILIZED, FOR SOLUTION INTRAVENOUS at 11:03

## 2023-07-18 RX ADMIN — Medication 15 MILLIGRAM(S): at 23:45

## 2023-07-18 RX ADMIN — HEPARIN SODIUM 5000 UNIT(S): 5000 INJECTION INTRAVENOUS; SUBCUTANEOUS at 22:55

## 2023-07-18 RX ADMIN — Medication 15 MILLIGRAM(S): at 22:55

## 2023-07-18 RX ADMIN — Medication 100 MILLIEQUIVALENT(S): at 06:18

## 2023-07-18 RX ADMIN — Medication 100 MILLIEQUIVALENT(S): at 08:22

## 2023-07-18 RX ADMIN — Medication 15 MILLIGRAM(S): at 09:19

## 2023-07-18 RX ADMIN — HYDROMORPHONE HYDROCHLORIDE 30 MILLILITER(S): 2 INJECTION INTRAMUSCULAR; INTRAVENOUS; SUBCUTANEOUS at 11:01

## 2023-07-18 RX ADMIN — HYDROMORPHONE HYDROCHLORIDE 30 MILLILITER(S): 2 INJECTION INTRAMUSCULAR; INTRAVENOUS; SUBCUTANEOUS at 08:36

## 2023-07-18 RX ADMIN — PIPERACILLIN AND TAZOBACTAM 25 GRAM(S): 4; .5 INJECTION, POWDER, LYOPHILIZED, FOR SOLUTION INTRAVENOUS at 17:53

## 2023-07-18 RX ADMIN — SODIUM CHLORIDE 1000 MILLILITER(S): 9 INJECTION, SOLUTION INTRAVENOUS at 19:33

## 2023-07-18 RX ADMIN — Medication 1000 MILLIGRAM(S): at 13:30

## 2023-07-18 RX ADMIN — Medication 400 MILLIGRAM(S): at 05:07

## 2023-07-18 RX ADMIN — Medication 15 MILLIGRAM(S): at 09:49

## 2023-07-18 RX ADMIN — Medication 400 MILLIGRAM(S): at 13:00

## 2023-07-18 RX ADMIN — HEPARIN SODIUM 5000 UNIT(S): 5000 INJECTION INTRAVENOUS; SUBCUTANEOUS at 13:51

## 2023-07-18 RX ADMIN — Medication 400 MILLIGRAM(S): at 18:01

## 2023-07-18 RX ADMIN — Medication 1000 MILLIGRAM(S): at 05:37

## 2023-07-18 RX ADMIN — HYDROMORPHONE HYDROCHLORIDE 30 MILLILITER(S): 2 INJECTION INTRAMUSCULAR; INTRAVENOUS; SUBCUTANEOUS at 20:20

## 2023-07-18 RX ADMIN — HEPARIN SODIUM 5000 UNIT(S): 5000 INJECTION INTRAVENOUS; SUBCUTANEOUS at 05:07

## 2023-07-18 RX ADMIN — Medication 1000 MILLIGRAM(S): at 18:31

## 2023-07-18 NOTE — DISCHARGE NOTE PROVIDER - NSDCMRMEDTOKEN_GEN_ALL_CORE_FT
Claritin 10 mg oral tablet: 1 tab(s) orally once a day PRN  Vyvanse 50 mg oral capsule: 1 cap(s) orally once a day (in the morning)  Wellbutrin  mg/12 hours oral tablet, extended release: 1 tab(s) orally once a day (in the morning)   acetaminophen 325 mg oral tablet: 3 tab(s) orally every 6 hours  Claritin 10 mg oral tablet: 1 tab(s) orally once a day PRN  ibuprofen 600 mg oral tablet: 1 tab(s) orally every 6 hours  oxyCODONE 5 mg oral tablet: 1 tab(s) orally 4 times a day as needed for  moderate pain MDD: 20  Vyvanse 50 mg oral capsule: 1 cap(s) orally once a day (in the morning)  Wellbutrin  mg/12 hours oral tablet, extended release: 1 tab(s) orally once a day (in the morning)   acetaminophen 325 mg oral tablet: 3 tab(s) orally every 6 hours  Claritin 10 mg oral tablet: 1 tab(s) orally once a day PRN  ibuprofen 600 mg oral tablet: 1 tab(s) orally every 6 hours  oxyCODONE 10 mg oral tablet: 1 tab(s) orally every 6 hours as needed for  moderate pain MDD: 40  Vyvanse 50 mg oral capsule: 1 cap(s) orally once a day (in the morning)  Wellbutrin  mg/12 hours oral tablet, extended release: 1 tab(s) orally once a day (in the morning)

## 2023-07-18 NOTE — PROVIDER CONTACT NOTE (OTHER) - ASSESSMENT
denies dizziness, feeling full, no bm, no flatus
Pt abdomen distended, attempted to bladder scan however unable to due to surgical dressing of pt

## 2023-07-18 NOTE — DISCHARGE NOTE PROVIDER - CARE PROVIDER_API CALL
Francisco Lr  Surgery  [address] [facility] [address], [address]  [place] [address]  Phone: [phone]  Fax: [phone]  Follow Up Time: 1 week

## 2023-07-18 NOTE — PROGRESS NOTE ADULT - SUBJECTIVE AND OBJECTIVE BOX
TEAM [ ** ] Surgery Daily Progress Note  =====================================================    SUBJECTIVE: Patient seen and examined at bedside on AM rounds. Patient reports that they're feeling well. Denies fever, chills, N/V, chest pain, SOB    ALLERGIES:  shellfish (Anaphylaxis)  ciprofloxacin (Rash)  Bakbenzylkonium chloride (Swelling)  iodine (Anaphylaxis)      --------------------------------------------------------------------------------------    MEDICATIONS:    Neurologic Medications  acetaminophen   IVPB .. 1000 milliGRAM(s) IV Intermittent every 6 hours  HYDROmorphone PCA (1 mG/mL) 30 milliLiter(s) PCA Continuous PCA Continuous  HYDROmorphone PCA (1 mG/mL) Rescue Clinician Bolus 0.5 milliGRAM(s) IV Push every 15 minutes PRN for Pain Scale GREATER THAN 6  ketorolac   Injectable 15 milliGRAM(s) IV Push every 6 hours  ondansetron Injectable 4 milliGRAM(s) IV Push every 6 hours PRN Nausea    Respiratory Medications    Cardiovascular Medications    Gastrointestinal Medications  lactated ringers. 1000 milliLiter(s) IV Continuous <Continuous>    Genitourinary Medications    Hematologic/Oncologic Medications  heparin   Injectable 5000 Unit(s) SubCutaneous every 8 hours    Antimicrobial/Immunologic Medications  piperacillin/tazobactam IVPB.- 3.375 Gram(s) IV Intermittent once  piperacillin/tazobactam IVPB.- 3.375 Gram(s) IV Intermittent once  piperacillin/tazobactam IVPB.. 3.375 Gram(s) IV Intermittent every 8 hours    Endocrine/Metabolic Medications    Topical/Other Medications  naloxone Injectable 0.1 milliGRAM(s) IV Push every 3 minutes PRN For ANY of the following changes in patient status:  A. RR LESS THAN 10 breaths per minute, B. Oxygen saturation LESS THAN 90%, C. Sedation score of 6    --------------------------------------------------------------------------------------    VITAL SIGNS:  T(C): 36.6 (07-18-23 @ 09:30), Max: 37 (07-17-23 @ 17:20)  HR: 88 (07-18-23 @ 09:30) (83 - 100)  BP: 99/59 (07-18-23 @ 09:30) (97/58 - 133/68)  RR: 17 (07-18-23 @ 09:30) (12 - 22)  SpO2: 97% (07-18-23 @ 09:30) (94% - 100%)  --------------------------------------------------------------------------------------    INS AND OUTS:    07-17-23 @ 07:01  -  07-18-23 @ 07:00  --------------------------------------------------------  IN: 1950 mL / OUT: 1000 mL / NET: 950 mL      --------------------------------------------------------------------------------------      EXAM    General: NAD, resting in bed comfortably.  Cardiac: regular rate, warm and well perfused  Respiratory: Nonlabored respirations, normal cw expansion.  Abdomen: soft, nontender, nondistended, surgical incision c/d/i, aquacel dressing, YONATHAN x 1  Extremities: normal strength, FROM, no deformities    --------------------------------------------------------------------------------------    LABS                        11.3   9.15  )-----------( 185 ( 18 Jul 2023 05:15 )             34.5       07-18    139  |  99  |  18  ----------------------------<  114<H>  3.9   |  28  |  0.66    Ca    8.5      18 Jul 2023 05:15  Phos  4.4     07-18  Mg     2.10     07-18

## 2023-07-18 NOTE — DISCHARGE NOTE PROVIDER - NSDCCPCAREPLAN_GEN_ALL_CORE_FT
PRINCIPAL DISCHARGE DIAGNOSIS  Diagnosis: Encounter for attention to colostomy  Assessment and Plan of Treatment:

## 2023-07-18 NOTE — DISCHARGE NOTE PROVIDER - NSDCFUADDINST_GEN_ALL_CORE_FT
WOUND CARE: Please keep incisions clean and dry.  Please do not scrub or rub incisions.  Do not use lotion or powder on incisions.   BATHING: Please do not submerge wound underwater.  You may shower and / or sponge bathe.  ACTIVITY: No heavy lifting or straining.  Otherwise, you may return to your usual level of physical activity.  If you are taking narcotic pain medication (such as Oxycodone or Percocet) DO NOT drive a car, operate machinery or make important decisions.  DIET: Resume a __________ diet.  NOTIFY YOUR SURGEON IF: You have any bleeding that does not stop, any pus draining from your wound(s), any fever (over 100.4 F) or chills, persistent nausea / vomiting, persistent diarrhea or if your pain is not controlled on your discharge pain medications.  FOLLOW-UP:   1. Please follow up with your primary care physician in one week regarding your hospitalization.    2. Please follow-up with your surgeon, Dr. Lr within 7 days following discharge.  Please call [phone] to schedule an appointment.  WOUND CARE: Please keep incisions clean and dry.  Please do not scrub or rub incisions.  Do not use lotion or powder on incisions.   BATHING: Please do not submerge wound underwater.  You may shower and / or sponge bathe.  ACTIVITY: No heavy lifting or straining.  Otherwise, you may return to your usual level of physical activity.  If you are taking narcotic pain medication (such as Oxycodone or Percocet) DO NOT drive a car, operate machinery or make important decisions.  DIET: Resume a low fiber diet.  NOTIFY YOUR SURGEON IF: You have any bleeding that does not stop, any pus draining from your wound(s), any fever (over 100.4 F) or chills, persistent nausea / vomiting, persistent diarrhea or if your pain is not controlled on your discharge pain medications.  FOLLOW-UP:   1. Please follow up with your primary care physician in one week regarding your hospitalization.    2. Please follow-up with your surgeon, Dr. Lr within 7 days following discharge.  Please call [phone] to schedule an appointment.  WOUND CARE: Please keep incisions clean and dry.  Please do not scrub or rub incisions.  Do not use lotion or powder on incisions.   BATHING: Please do not submerge wound underwater.  You may shower and / or sponge bathe.  ACTIVITY: No heavy lifting or straining.  Otherwise, you may return to your usual level of physical activity.  If you are taking narcotic pain medication (such as Oxycodone or Percocet) DO NOT drive a car, operate machinery or make important decisions.  DIET: Resume a low fiber diet.  NOTIFY YOUR SURGEON IF: You have any bleeding that does not stop, any pus draining from your wound(s), any fever (over 100.4 F) or chills, persistent nausea / vomiting, persistent diarrhea or if your pain is not controlled on your discharge pain medications.  FOLLOW-UP:   1. Please follow up with your primary care physician in one week regarding your hospitalization.    2. Please follow-up with your surgeon, Dr. Lr on Tuesday 7/25 for YONATHAN drain removal.  Please call [phone] to schedule an appointment.

## 2023-07-18 NOTE — PROGRESS NOTE ADULT - ASSESSMENT
57 year old female with PMH diverticulitis s/p colostomy, ADHD, heart murmur, HTN presents for ex lap, JASIEL, takedown of colostomy on 7/17    Plan  - NPO, IVF  - Zosyn  - Pain control w PCA   - Espino  - DVT prophylaxis    A team surgery [id number]

## 2023-07-18 NOTE — PROVIDER CONTACT NOTE (OTHER) - BACKGROUND
s/p frida's procedure reversal and hernia repair
Pt with pmhx of adhd, heart murmur, diverticulitis, htn s/p hartmanns reversal with incisional hernia repair

## 2023-07-18 NOTE — PROGRESS NOTE ADULT - SUBJECTIVE AND OBJECTIVE BOX
Anesthesia Pain Management Service    SUBJECTIVE: Patient reports some abdominal pain. Reports that the IV PCA helps but she needs to use the demand dose twice to get better pain control. Denies opioid use at home. Denies alcohol use, smoking and illicit drug use.    Pain Scale Score	At rest: _6/10__ 	With Activity: ___ 	    THERAPY:    [ ] IV PCA Morphine		[ ] 5 mg/mL	[ ] 1 mg/mL  [X ] IV PCA Hydromorphone	[ ] 5 mg/mL	[X ] 1 mg/mL  [ ] IV PCA Fentanyl		[ ] 50 micrograms/mL    Demand dose __0.2_ lockout __6_ (minutes) Continuous Rate _0__ Total: _4.8__  mg used (in past 24 hours)      MEDICATIONS  (STANDING):  acetaminophen   IVPB .. 1000 milliGRAM(s) IV Intermittent every 6 hours  heparin   Injectable 5000 Unit(s) SubCutaneous every 8 hours  HYDROmorphone PCA (1 mG/mL) 30 milliLiter(s) PCA Continuous PCA Continuous  ketorolac   Injectable 15 milliGRAM(s) IV Push every 6 hours  lactated ringers. 1000 milliLiter(s) (150 mL/Hr) IV Continuous <Continuous>  piperacillin/tazobactam IVPB.- 3.375 Gram(s) IV Intermittent once  piperacillin/tazobactam IVPB.- 3.375 Gram(s) IV Intermittent once  piperacillin/tazobactam IVPB.. 3.375 Gram(s) IV Intermittent every 8 hours    MEDICATIONS  (PRN):  HYDROmorphone PCA (1 mG/mL) Rescue Clinician Bolus 0.5 milliGRAM(s) IV Push every 15 minutes PRN for Pain Scale GREATER THAN 6  naloxone Injectable 0.1 milliGRAM(s) IV Push every 3 minutes PRN For ANY of the following changes in patient status:  A. RR LESS THAN 10 breaths per minute, B. Oxygen saturation LESS THAN 90%, C. Sedation score of 6  ondansetron Injectable 4 milliGRAM(s) IV Push every 6 hours PRN Nausea      OBJECTIVE: Patient sitting up on bed.    Sedation Score:	[ X] Alert	[ ] Drowsy 	[ ] Arousable	[ ] Asleep	[ ] Unresponsive    Side Effects:	[X ] None	[ ] Nausea	[ ] Vomiting	[ ] Pruritus  		[ ] Other:    Vital Signs Last 24 Hrs  T(C): 36.6 (18 Jul 2023 09:30), Max: 37 (17 Jul 2023 17:20)  T(F): 97.8 (18 Jul 2023 09:30), Max: 98.6 (17 Jul 2023 17:20)  HR: 88 (18 Jul 2023 09:30) (82 - 100)  BP: 99/59 (18 Jul 2023 09:30) (97/58 - 133/68)  BP(mean): 80 (17 Jul 2023 20:45) (73 - 84)  RR: 17 (18 Jul 2023 09:30) (12 - 22)  SpO2: 97% (18 Jul 2023 09:30) (94% - 100%)    Parameters below as of 18 Jul 2023 09:30  Patient On (Oxygen Delivery Method): room air        ASSESSMENT/ PLAN    Therapy to  be:	[ X] Continue   [ ] Discontinued   [ ] Change to prn Analgesics    Documentation and Verification of current medications:   [X] Done	[ ] Not done, not elligible    Comments: Continue IV PCA. Demand dose increased to 0.3mg. Plan discussed with primary team. Recommend non-opioid adjuvant analgesics to be used when possible and when allowed by primary surgical team.    Progress Note written now but Patient was seen earlier.

## 2023-07-18 NOTE — DISCHARGE NOTE PROVIDER - HOSPITAL COURSE
Patient is a 57 year old female with a PMHx of HTN and diverticulitis (S/O colostomy with colon resection 3/2/23) who presented to pre-surgical testing for evaluation for reversal.    On 7/17 patient went to the OR for an ex-lap, lysis of adhesions and takedown of colostomy.  Post operatively patient was kept NPO with IV fluids.  Pain was controlled with IV PCA Dilaudid pump.      ***COMPLETE UP TO 7/17*** Patient is a 57 year old female with a PMHx of HTN and diverticulitis (S/O colostomy with colon resection 3/2/23) who presented to pre-surgical testing for evaluation for reversal.    On 7/17 patient went to the OR for an ex-lap, lysis of adhesions and takedown of colostomy.  Post operatively patient was kept NPO with IV fluids.  Pain was controlled with IV PCA Dilaudid pump.    On 7/19 patient was advanced to a clear liquid diet, which she tolerated well.      ***COMPLETE UP TO 7/19*** Patient is a 57 year old female with a PMHx of HTN and diverticulitis (S/O colostomy with colon resection 3/2/23) who presented to pre-surgical testing for evaluation for reversal.    On 7/17 patient went to the OR for an ex-lap, lysis of adhesions and takedown of colostomy.  Post operatively patient was kept NPO with IV fluids.  Pain was controlled with IV PCA Dilaudid pump.    On 7/19 patient was advanced to a clear liquid diet, which she tolerated well.    On 7/20 patient was advanced to a low fiber diet, which she tolerated well.  PCA pump was discontinued and pain was controlled with oral medications.    At the time of discharge, the patient was hemodynamically stable, was tolerating PO diet, was voiding urine and passing stool.  She was ambulating and was comfortable with adequate pain control.  The patient was instructed to follow up with Dr. Lr within 1 week after discharge from the hospital.  The patient / family felt comfortable with discharge.  The patient had no other issues.    Per attending, patient deemed medically stable and ready for discharge at this time. Patient is a 57 year old female with a PMHx of HTN and diverticulitis (S/O colostomy with colon resection 3/2/23) who presented to pre-surgical testing for evaluation for reversal.    On 7/17 patient went to the OR for an ex-lap, lysis of adhesions and takedown of colostomy.  Post operatively patient was kept NPO with IV fluids.  Pain was controlled with IV PCA Dilaudid pump.    On 7/19 patient was advanced to a clear liquid diet, which she tolerated well.    On 7/20 patient was advanced to a low fiber diet, which she tolerated well.  PCA pump was discontinued and pain was controlled with oral medications.    On 7/21 patient continued to recover appropriately with good pain control and tolerating diet.    At the time of discharge, the patient was hemodynamically stable, was tolerating PO diet, was voiding urine and passing stool.  She was ambulating and was comfortable with adequate pain control.  The patient was instructed to follow up with Dr. Lr within 1 week after discharge from the hospital.  The patient / family felt comfortable with discharge.  The patient had no other issues.    Per attending, patient deemed medically stable and ready for discharge at this time.

## 2023-07-18 NOTE — DISCHARGE NOTE PROVIDER - NSDCCPTREATMENT_GEN_ALL_CORE_FT
PRINCIPAL PROCEDURE  Procedure: Reanastomosis of colon with reversal of Ladonna's pouch  Findings and Treatment: 7/17/23

## 2023-07-18 NOTE — PROGRESS NOTE ADULT - ATTENDING COMMENTS
DATE OF SERVICE: 07-18-23 @ 15:34    No events, feeling well. pain improved with PCA  Abdomen soft appropriate TTP, YONATHAN SS    OOb and ambulate  Pain control  Await bowel fx

## 2023-07-19 LAB
ANION GAP SERPL CALC-SCNC: 4 MMOL/L — LOW (ref 7–14)
BUN SERPL-MCNC: 11 MG/DL — SIGNIFICANT CHANGE UP (ref 7–23)
CALCIUM SERPL-MCNC: 9.1 MG/DL — SIGNIFICANT CHANGE UP (ref 8.4–10.5)
CHLORIDE SERPL-SCNC: 105 MMOL/L — SIGNIFICANT CHANGE UP (ref 98–107)
CO2 SERPL-SCNC: 27 MMOL/L — SIGNIFICANT CHANGE UP (ref 22–31)
CREAT SERPL-MCNC: 0.66 MG/DL — SIGNIFICANT CHANGE UP (ref 0.5–1.3)
EGFR: 104 ML/MIN/1.73M2 — SIGNIFICANT CHANGE UP
GLUCOSE SERPL-MCNC: 84 MG/DL — SIGNIFICANT CHANGE UP (ref 70–99)
HCT VFR BLD CALC: 30.8 % — LOW (ref 34.5–45)
HGB BLD-MCNC: 9.7 G/DL — LOW (ref 11.5–15.5)
MAGNESIUM SERPL-MCNC: 1.9 MG/DL — SIGNIFICANT CHANGE UP (ref 1.6–2.6)
MCHC RBC-ENTMCNC: 31.5 GM/DL — LOW (ref 32–36)
MCHC RBC-ENTMCNC: 32 PG — SIGNIFICANT CHANGE UP (ref 27–34)
MCV RBC AUTO: 101.7 FL — HIGH (ref 80–100)
NRBC # BLD: 0 /100 WBCS — SIGNIFICANT CHANGE UP (ref 0–0)
NRBC # FLD: 0 K/UL — SIGNIFICANT CHANGE UP (ref 0–0)
PHOSPHATE SERPL-MCNC: 2.5 MG/DL — SIGNIFICANT CHANGE UP (ref 2.5–4.5)
PLATELET # BLD AUTO: 149 K/UL — LOW (ref 150–400)
POTASSIUM SERPL-MCNC: 3.6 MMOL/L — SIGNIFICANT CHANGE UP (ref 3.5–5.3)
POTASSIUM SERPL-SCNC: 3.6 MMOL/L — SIGNIFICANT CHANGE UP (ref 3.5–5.3)
RBC # BLD: 3.03 M/UL — LOW (ref 3.8–5.2)
RBC # FLD: 13 % — SIGNIFICANT CHANGE UP (ref 10.3–14.5)
SODIUM SERPL-SCNC: 136 MMOL/L — SIGNIFICANT CHANGE UP (ref 135–145)
WBC # BLD: 5.31 K/UL — SIGNIFICANT CHANGE UP (ref 3.8–10.5)
WBC # FLD AUTO: 5.31 K/UL — SIGNIFICANT CHANGE UP (ref 3.8–10.5)

## 2023-07-19 RX ORDER — POTASSIUM CHLORIDE 20 MEQ
40 PACKET (EA) ORAL ONCE
Refills: 0 | Status: COMPLETED | OUTPATIENT
Start: 2023-07-19 | End: 2023-07-19

## 2023-07-19 RX ORDER — SODIUM,POTASSIUM PHOSPHATES 278-250MG
1 POWDER IN PACKET (EA) ORAL ONCE
Refills: 0 | Status: COMPLETED | OUTPATIENT
Start: 2023-07-19 | End: 2023-07-19

## 2023-07-19 RX ORDER — ACETAMINOPHEN 500 MG
975 TABLET ORAL EVERY 6 HOURS
Refills: 0 | Status: DISCONTINUED | OUTPATIENT
Start: 2023-07-20 | End: 2023-07-22

## 2023-07-19 RX ADMIN — HEPARIN SODIUM 5000 UNIT(S): 5000 INJECTION INTRAVENOUS; SUBCUTANEOUS at 13:57

## 2023-07-19 RX ADMIN — Medication 400 MILLIGRAM(S): at 13:57

## 2023-07-19 RX ADMIN — Medication 15 MILLIGRAM(S): at 07:00

## 2023-07-19 RX ADMIN — Medication 40 MILLIEQUIVALENT(S): at 10:30

## 2023-07-19 RX ADMIN — HYDROMORPHONE HYDROCHLORIDE 30 MILLILITER(S): 2 INJECTION INTRAMUSCULAR; INTRAVENOUS; SUBCUTANEOUS at 08:33

## 2023-07-19 RX ADMIN — Medication 400 MILLIGRAM(S): at 01:21

## 2023-07-19 RX ADMIN — Medication 1 PACKET(S): at 10:30

## 2023-07-19 RX ADMIN — Medication 15 MILLIGRAM(S): at 18:31

## 2023-07-19 RX ADMIN — HEPARIN SODIUM 5000 UNIT(S): 5000 INJECTION INTRAVENOUS; SUBCUTANEOUS at 06:34

## 2023-07-19 RX ADMIN — HYDROMORPHONE HYDROCHLORIDE 30 MILLILITER(S): 2 INJECTION INTRAMUSCULAR; INTRAVENOUS; SUBCUTANEOUS at 20:09

## 2023-07-19 RX ADMIN — Medication 400 MILLIGRAM(S): at 21:51

## 2023-07-19 RX ADMIN — PIPERACILLIN AND TAZOBACTAM 25 GRAM(S): 4; .5 INJECTION, POWDER, LYOPHILIZED, FOR SOLUTION INTRAVENOUS at 01:20

## 2023-07-19 RX ADMIN — HEPARIN SODIUM 5000 UNIT(S): 5000 INJECTION INTRAVENOUS; SUBCUTANEOUS at 21:52

## 2023-07-19 RX ADMIN — PIPERACILLIN AND TAZOBACTAM 25 GRAM(S): 4; .5 INJECTION, POWDER, LYOPHILIZED, FOR SOLUTION INTRAVENOUS at 10:11

## 2023-07-19 RX ADMIN — Medication 400 MILLIGRAM(S): at 07:44

## 2023-07-19 RX ADMIN — Medication 15 MILLIGRAM(S): at 11:47

## 2023-07-19 RX ADMIN — Medication 15 MILLIGRAM(S): at 11:17

## 2023-07-19 RX ADMIN — Medication 1000 MILLIGRAM(S): at 22:13

## 2023-07-19 RX ADMIN — Medication 1000 MILLIGRAM(S): at 14:27

## 2023-07-19 RX ADMIN — Medication 1000 MILLIGRAM(S): at 02:00

## 2023-07-19 RX ADMIN — Medication 15 MILLIGRAM(S): at 06:34

## 2023-07-19 RX ADMIN — SODIUM CHLORIDE 75 MILLILITER(S): 9 INJECTION, SOLUTION INTRAVENOUS at 21:52

## 2023-07-19 RX ADMIN — Medication 1000 MILLIGRAM(S): at 08:00

## 2023-07-19 NOTE — MEDICAL STUDENT PROGRESS NOTE(EDUCATION) - SUBJECTIVE AND OBJECTIVE BOX
GENERAL SURGERY DAILY PROGRESS NOTE:    Interval:  overnight hypotensive to 92/46, s/p 1L LR bolus  subjective complaint of urinary retention/suprapubic fullness and tenderness, bedside bladder scan negative and urine output 800 mL    Subjective:  Patient seen and examined. Reports pain is well controlled. Denies N/V. Tolerating diet. Passing very little flatus, no BM. Ambulating.    Vital Signs Last 24 Hrs  T(C): 36.7 (19 Jul 2023 02:00), Max: 37.1 (18 Jul 2023 22:28)  T(F): 98.1 (19 Jul 2023 02:00), Max: 98.8 (18 Jul 2023 22:28)  HR: 90 (19 Jul 2023 02:00) (83 - 90)  BP: 100/60 (19 Jul 2023 02:00) (92/46 - 104/57)  BP(mean): --  RR: 18 (19 Jul 2023 02:00) (17 - 18)  SpO2: 96% (19 Jul 2023 02:00) (95% - 97%)    Parameters below as of 19 Jul 2023 02:00  Patient On (Oxygen Delivery Method): room air        Exam:  Gen: NAD, resting in bed, alert and responding appropriately  Resp: Airway patent, non-labored respirations  Abd: Soft, ND, NT, no rebound or guarding. Incisions c/d/i  Ext: No edema, WWP  Neuro: AAOx3, no focal deficits    I&O's Detail    18 Jul 2023 07:01  -  19 Jul 2023 07:00  --------------------------------------------------------  IN:    IV PiggyBack: 400 mL    Lactated Ringers: 2550 mL    Lactated Ringers Bolus: 1000 mL  Total IN: 3950 mL    OUT:    Bulb (mL): 65.5 mL    Indwelling Catheter - Urethral (mL): 1355 mL    Oral Fluid: 0 mL  Total OUT: 1420.5 mL    Total NET: 2529.5 mL          Daily     Daily     MEDICATIONS  (STANDING):  acetaminophen   IVPB .. 1000 milliGRAM(s) IV Intermittent every 6 hours  heparin   Injectable 5000 Unit(s) SubCutaneous every 8 hours  HYDROmorphone PCA (1 mG/mL) 30 milliLiter(s) PCA Continuous PCA Continuous  ketorolac   Injectable 15 milliGRAM(s) IV Push every 6 hours  lactated ringers. 1000 milliLiter(s) (150 mL/Hr) IV Continuous <Continuous>  piperacillin/tazobactam IVPB.. 3.375 Gram(s) IV Intermittent every 8 hours    MEDICATIONS  (PRN):  HYDROmorphone PCA (1 mG/mL) Rescue Clinician Bolus 0.5 milliGRAM(s) IV Push every 15 minutes PRN for Pain Scale GREATER THAN 6  naloxone Injectable 0.1 milliGRAM(s) IV Push every 3 minutes PRN For ANY of the following changes in patient status:  A. RR LESS THAN 10 breaths per minute, B. Oxygen saturation LESS THAN 90%, C. Sedation score of 6  ondansetron Injectable 4 milliGRAM(s) IV Push every 6 hours PRN Nausea      LABS:                        11.3   9.15  )-----------( 185      ( 18 Jul 2023 05:15 )             34.5     07-18    139  |  99  |  18  ----------------------------<  114<H>  3.9   |  28  |  0.66    Ca    8.5      18 Jul 2023 05:15  Phos  4.4     07-18  Mg     2.10     07-18        Urinalysis Basic - ( 18 Jul 2023 05:15 )    Color: x / Appearance: x / SG: x / pH: x  Gluc: 114 mg/dL / Ketone: x  / Bili: x / Urobili: x   Blood: x / Protein: x / Nitrite: x   Leuk Esterase: x / RBC: x / WBC x   Sq Epi: x / Non Sq Epi: x / Bacteria: x

## 2023-07-19 NOTE — PROGRESS NOTE ADULT - ATTENDING COMMENTS
DATE OF SERVICE: 07-19-23 @ 15:45    No events, no flatus yet, pain improved  Labs stable  abd soft appropriate TTP, YONATHAN SS    DC smith  CLD  OOB

## 2023-07-19 NOTE — PROGRESS NOTE ADULT - SUBJECTIVE AND OBJECTIVE BOX
Anesthesia Pain Management Service    SUBJECTIVE: Patient is doing well with IV PCA and no significant problems reported.    Pain Scale Score	At rest: _4/10__ 	With Activity: ___ 	[X ] Refer to charted pain scores    THERAPY:    [ ] IV PCA Morphine		[ ] 5 mg/mL	[ ] 1 mg/mL  [X ] IV PCA Hydromorphone	[ ] 5 mg/mL	[X ] 1 mg/mL  [ ] IV PCA Fentanyl		[ ] 50 micrograms/mL    Demand dose __0.2_ lockout __6_ (minutes) Continuous Rate _0__ Total: __5.1_  mg used (in past 24 hours)      MEDICATIONS  (STANDING):  acetaminophen   IVPB .. 1000 milliGRAM(s) IV Intermittent every 6 hours  heparin   Injectable 5000 Unit(s) SubCutaneous every 8 hours  HYDROmorphone PCA (1 mG/mL) 30 milliLiter(s) PCA Continuous PCA Continuous  ketorolac   Injectable 15 milliGRAM(s) IV Push every 6 hours  lactated ringers. 1000 milliLiter(s) (75 mL/Hr) IV Continuous <Continuous>  piperacillin/tazobactam IVPB.. 3.375 Gram(s) IV Intermittent every 8 hours  potassium chloride   Powder 40 milliEquivalent(s) Oral once  potassium phosphate / sodium phosphate Powder (PHOS-NaK) 1 Packet(s) Oral once    MEDICATIONS  (PRN):  HYDROmorphone PCA (1 mG/mL) Rescue Clinician Bolus 0.5 milliGRAM(s) IV Push every 15 minutes PRN for Pain Scale GREATER THAN 6  naloxone Injectable 0.1 milliGRAM(s) IV Push every 3 minutes PRN For ANY of the following changes in patient status:  A. RR LESS THAN 10 breaths per minute, B. Oxygen saturation LESS THAN 90%, C. Sedation score of 6  ondansetron Injectable 4 milliGRAM(s) IV Push every 6 hours PRN Nausea      OBJECTIVE: Patient sitting in bed.    Sedation Score:	[ X] Alert	[ ] Drowsy 	[ ] Arousable	[ ] Asleep	[ ] Unresponsive    Side Effects:	[X ] None	[ ] Nausea	[ ] Vomiting	[ ] Pruritus  		[ ] Other:    Vital Signs Last 24 Hrs  T(C): 36.9 (19 Jul 2023 09:40), Max: 37.1 (18 Jul 2023 22:28)  T(F): 98.4 (19 Jul 2023 09:40), Max: 98.8 (18 Jul 2023 22:28)  HR: 84 (19 Jul 2023 09:40) (82 - 90)  BP: 108/62 (19 Jul 2023 09:40) (92/46 - 108/62)  BP(mean): --  RR: 17 (19 Jul 2023 09:40) (17 - 18)  SpO2: 98% (19 Jul 2023 09:40) (95% - 99%)    Parameters below as of 19 Jul 2023 09:40  Patient On (Oxygen Delivery Method): room air        ASSESSMENT/ PLAN    Therapy to  be:	[ X] Continue   [ ] Discontinued   [ ] Change to prn Analgesics    Documentation and Verification of current medications:   [X] Done	[ ] Not done, not elligible    Comments: Continue IV PCA. Recommend non-opioid adjuvant analgesics to be used when possible and when allowed by primary surgical team.    Progress Note written now but Patient was seen earlier.

## 2023-07-19 NOTE — MEDICAL STUDENT PROGRESS NOTE(EDUCATION) - ASSESSMENT
56 yo female PMH perforated diverticulitis s/p Ladonna colostomy in 3/2023, ADHD, heart murmur, HTN POD 2 from Ladonna takedown recovering well.    Plan  d/c smith  advance diet as tolerated  pain control w/ PCA  continue IVF, Zosyn

## 2023-07-19 NOTE — PROGRESS NOTE ADULT - ASSESSMENT
57 year old female with PMH diverticulitis s/p colostomy, ADHD, heart murmur, HTN presents for ex lap, AJSIEL, takedown of colostomy on 7/17    Plan  - NPO, IVF  - Zosyn  - Pain control w PCA   - d/c smith today  - DVT prophylaxis    A team surgery [id number] 57 year old female with PMH diverticulitis s/p colostomy, ADHD, heart murmur, HTN presents for ex lap, JASIEL, takedown of colostomy on 7/17    Plan  - NPO, IVF  - Zosyn  - Pain control w PCA   - possible d/c smith today  - DVT prophylaxis    A team surgery [id number]

## 2023-07-19 NOTE — PROGRESS NOTE ADULT - SUBJECTIVE AND OBJECTIVE BOX
GENERAL SURGERY DAILY PROGRESS NOTE:    Interval:  Some suprapubic pain overnight.    Subjective:  Patient seen and examined. Reports pain is moderately controlled. Denies N/V. Tolerating diet. No flatus or BM. Ambulating.    Vital Signs Last 24 Hrs  T(C): 36.7 (19 Jul 2023 02:00), Max: 37.1 (18 Jul 2023 22:28)  T(F): 98.1 (19 Jul 2023 02:00), Max: 98.8 (18 Jul 2023 22:28)  HR: 90 (19 Jul 2023 02:00) (83 - 90)  BP: 100/60 (19 Jul 2023 02:00) (92/46 - 104/57)  BP(mean): --  RR: 18 (19 Jul 2023 02:00) (17 - 18)  SpO2: 96% (19 Jul 2023 02:00) (95% - 97%)    Parameters below as of 19 Jul 2023 02:00  Patient On (Oxygen Delivery Method): room air        Exam:  Gen: NAD, resting in bed, alert and responding appropriately  Resp: Airway patent, non-labored respirations  Abd: Soft, ND, NT, no rebound or guarding. Incisions c/d/i. Bulb drain output serosanguinous  Ext: No edema, WWP  Neuro: AAOx3, no focal deficits    I&O's Detail    18 Jul 2023 07:01  -  19 Jul 2023 07:00  --------------------------------------------------------  IN:    IV PiggyBack: 400 mL    Lactated Ringers: 2550 mL    Lactated Ringers Bolus: 1000 mL  Total IN: 3950 mL    OUT:    Bulb (mL): 65.5 mL    Indwelling Catheter - Urethral (mL): 1355 mL    Oral Fluid: 0 mL  Total OUT: 1420.5 mL    Total NET: 2529.5 mL    MEDICATIONS  (STANDING):  acetaminophen   IVPB .. 1000 milliGRAM(s) IV Intermittent every 6 hours  heparin   Injectable 5000 Unit(s) SubCutaneous every 8 hours  HYDROmorphone PCA (1 mG/mL) 30 milliLiter(s) PCA Continuous PCA Continuous  ketorolac   Injectable 15 milliGRAM(s) IV Push every 6 hours  lactated ringers. 1000 milliLiter(s) (150 mL/Hr) IV Continuous <Continuous>  piperacillin/tazobactam IVPB.. 3.375 Gram(s) IV Intermittent every 8 hours    MEDICATIONS  (PRN):  HYDROmorphone PCA (1 mG/mL) Rescue Clinician Bolus 0.5 milliGRAM(s) IV Push every 15 minutes PRN for Pain Scale GREATER THAN 6  naloxone Injectable 0.1 milliGRAM(s) IV Push every 3 minutes PRN For ANY of the following changes in patient status:  A. RR LESS THAN 10 breaths per minute, B. Oxygen saturation LESS THAN 90%, C. Sedation score of 6  ondansetron Injectable 4 milliGRAM(s) IV Push every 6 hours PRN Nausea      LABS:                        11.3   9.15  )-----------( 185      ( 18 Jul 2023 05:15 )             34.5     07-18    139  |  99  |  18  ----------------------------<  114<H>  3.9   |  28  |  0.66    Ca    8.5      18 Jul 2023 05:15  Phos  4.4     07-18  Mg     2.10     07-18        Urinalysis Basic - ( 18 Jul 2023 05:15 )    Color: x / Appearance: x / SG: x / pH: x  Gluc: 114 mg/dL / Ketone: x  / Bili: x / Urobili: x   Blood: x / Protein: x / Nitrite: x   Leuk Esterase: x / RBC: x / WBC x   Sq Epi: x / Non Sq Epi: x / Bacteria: x

## 2023-07-20 LAB
-  AMIKACIN: SIGNIFICANT CHANGE UP
-  AZTREONAM: SIGNIFICANT CHANGE UP
-  CEFEPIME: SIGNIFICANT CHANGE UP
-  CEFTAZIDIME: SIGNIFICANT CHANGE UP
-  CIPROFLOXACIN: SIGNIFICANT CHANGE UP
-  GENTAMICIN: SIGNIFICANT CHANGE UP
-  IMIPENEM: SIGNIFICANT CHANGE UP
-  LEVOFLOXACIN: SIGNIFICANT CHANGE UP
-  MEROPENEM: SIGNIFICANT CHANGE UP
-  PIPERACILLIN/TAZOBACTAM: SIGNIFICANT CHANGE UP
-  TOBRAMYCIN: SIGNIFICANT CHANGE UP
ANION GAP SERPL CALC-SCNC: 11 MMOL/L — SIGNIFICANT CHANGE UP (ref 7–14)
BUN SERPL-MCNC: 6 MG/DL — LOW (ref 7–23)
CALCIUM SERPL-MCNC: 9.2 MG/DL — SIGNIFICANT CHANGE UP (ref 8.4–10.5)
CHLORIDE SERPL-SCNC: 102 MMOL/L — SIGNIFICANT CHANGE UP (ref 98–107)
CO2 SERPL-SCNC: 27 MMOL/L — SIGNIFICANT CHANGE UP (ref 22–31)
CREAT SERPL-MCNC: 0.48 MG/DL — LOW (ref 0.5–1.3)
EGFR: 112 ML/MIN/1.73M2 — SIGNIFICANT CHANGE UP
GLUCOSE SERPL-MCNC: 78 MG/DL — SIGNIFICANT CHANGE UP (ref 70–99)
HCT VFR BLD CALC: 32.3 % — LOW (ref 34.5–45)
HGB BLD-MCNC: 10.3 G/DL — LOW (ref 11.5–15.5)
MAGNESIUM SERPL-MCNC: 1.8 MG/DL — SIGNIFICANT CHANGE UP (ref 1.6–2.6)
MCHC RBC-ENTMCNC: 31.9 GM/DL — LOW (ref 32–36)
MCHC RBC-ENTMCNC: 32.2 PG — SIGNIFICANT CHANGE UP (ref 27–34)
MCV RBC AUTO: 100.9 FL — HIGH (ref 80–100)
METHOD TYPE: SIGNIFICANT CHANGE UP
NRBC # BLD: 0 /100 WBCS — SIGNIFICANT CHANGE UP (ref 0–0)
NRBC # FLD: 0 K/UL — SIGNIFICANT CHANGE UP (ref 0–0)
PHOSPHATE SERPL-MCNC: 3.1 MG/DL — SIGNIFICANT CHANGE UP (ref 2.5–4.5)
PLATELET # BLD AUTO: 162 K/UL — SIGNIFICANT CHANGE UP (ref 150–400)
POTASSIUM SERPL-MCNC: 3.8 MMOL/L — SIGNIFICANT CHANGE UP (ref 3.5–5.3)
POTASSIUM SERPL-SCNC: 3.8 MMOL/L — SIGNIFICANT CHANGE UP (ref 3.5–5.3)
RBC # BLD: 3.2 M/UL — LOW (ref 3.8–5.2)
RBC # FLD: 12.3 % — SIGNIFICANT CHANGE UP (ref 10.3–14.5)
SODIUM SERPL-SCNC: 140 MMOL/L — SIGNIFICANT CHANGE UP (ref 135–145)
WBC # BLD: 4.7 K/UL — SIGNIFICANT CHANGE UP (ref 3.8–10.5)
WBC # FLD AUTO: 4.7 K/UL — SIGNIFICANT CHANGE UP (ref 3.8–10.5)

## 2023-07-20 RX ORDER — SODIUM,POTASSIUM PHOSPHATES 278-250MG
1 POWDER IN PACKET (EA) ORAL
Refills: 0 | Status: COMPLETED | OUTPATIENT
Start: 2023-07-20 | End: 2023-07-20

## 2023-07-20 RX ORDER — OXYCODONE HYDROCHLORIDE 5 MG/1
5 TABLET ORAL EVERY 4 HOURS
Refills: 0 | Status: DISCONTINUED | OUTPATIENT
Start: 2023-07-20 | End: 2023-07-20

## 2023-07-20 RX ORDER — IBUPROFEN 200 MG
400 TABLET ORAL EVERY 6 HOURS
Refills: 0 | Status: DISCONTINUED | OUTPATIENT
Start: 2023-07-20 | End: 2023-07-20

## 2023-07-20 RX ORDER — OXYCODONE HYDROCHLORIDE 5 MG/1
5 TABLET ORAL EVERY 4 HOURS
Refills: 0 | Status: DISCONTINUED | OUTPATIENT
Start: 2023-07-20 | End: 2023-07-22

## 2023-07-20 RX ORDER — BUPROPION HYDROCHLORIDE 150 MG/1
150 TABLET, EXTENDED RELEASE ORAL DAILY
Refills: 0 | Status: DISCONTINUED | OUTPATIENT
Start: 2023-07-20 | End: 2023-07-22

## 2023-07-20 RX ORDER — IBUPROFEN 200 MG
600 TABLET ORAL EVERY 6 HOURS
Refills: 0 | Status: DISCONTINUED | OUTPATIENT
Start: 2023-07-20 | End: 2023-07-22

## 2023-07-20 RX ORDER — OXYCODONE HYDROCHLORIDE 5 MG/1
5 TABLET ORAL EVERY 6 HOURS
Refills: 0 | Status: DISCONTINUED | OUTPATIENT
Start: 2023-07-20 | End: 2023-07-20

## 2023-07-20 RX ORDER — OXYCODONE HYDROCHLORIDE 5 MG/1
10 TABLET ORAL EVERY 4 HOURS
Refills: 0 | Status: DISCONTINUED | OUTPATIENT
Start: 2023-07-20 | End: 2023-07-22

## 2023-07-20 RX ORDER — OXYCODONE HYDROCHLORIDE 5 MG/1
5 TABLET ORAL ONCE
Refills: 0 | Status: DISCONTINUED | OUTPATIENT
Start: 2023-07-20 | End: 2023-07-20

## 2023-07-20 RX ORDER — OXYCODONE HYDROCHLORIDE 5 MG/1
10 TABLET ORAL EVERY 6 HOURS
Refills: 0 | Status: DISCONTINUED | OUTPATIENT
Start: 2023-07-20 | End: 2023-07-20

## 2023-07-20 RX ORDER — OXYCODONE HYDROCHLORIDE 5 MG/1
2.5 TABLET ORAL EVERY 4 HOURS
Refills: 0 | Status: DISCONTINUED | OUTPATIENT
Start: 2023-07-20 | End: 2023-07-20

## 2023-07-20 RX ADMIN — Medication 975 MILLIGRAM(S): at 22:30

## 2023-07-20 RX ADMIN — OXYCODONE HYDROCHLORIDE 5 MILLIGRAM(S): 5 TABLET ORAL at 10:57

## 2023-07-20 RX ADMIN — OXYCODONE HYDROCHLORIDE 5 MILLIGRAM(S): 5 TABLET ORAL at 11:29

## 2023-07-20 RX ADMIN — Medication 600 MILLIGRAM(S): at 18:36

## 2023-07-20 RX ADMIN — Medication 15 MILLIGRAM(S): at 01:47

## 2023-07-20 RX ADMIN — OXYCODONE HYDROCHLORIDE 10 MILLIGRAM(S): 5 TABLET ORAL at 16:41

## 2023-07-20 RX ADMIN — Medication 975 MILLIGRAM(S): at 06:01

## 2023-07-20 RX ADMIN — Medication 1 PACKET(S): at 18:06

## 2023-07-20 RX ADMIN — Medication 975 MILLIGRAM(S): at 05:01

## 2023-07-20 RX ADMIN — Medication 975 MILLIGRAM(S): at 11:29

## 2023-07-20 RX ADMIN — Medication 1 PACKET(S): at 07:37

## 2023-07-20 RX ADMIN — BUPROPION HYDROCHLORIDE 150 MILLIGRAM(S): 150 TABLET, EXTENDED RELEASE ORAL at 12:29

## 2023-07-20 RX ADMIN — Medication 400 MILLIGRAM(S): at 14:46

## 2023-07-20 RX ADMIN — HEPARIN SODIUM 5000 UNIT(S): 5000 INJECTION INTRAVENOUS; SUBCUTANEOUS at 22:01

## 2023-07-20 RX ADMIN — OXYCODONE HYDROCHLORIDE 5 MILLIGRAM(S): 5 TABLET ORAL at 12:00

## 2023-07-20 RX ADMIN — Medication 400 MILLIGRAM(S): at 14:16

## 2023-07-20 RX ADMIN — OXYCODONE HYDROCHLORIDE 10 MILLIGRAM(S): 5 TABLET ORAL at 16:11

## 2023-07-20 RX ADMIN — Medication 975 MILLIGRAM(S): at 22:00

## 2023-07-20 RX ADMIN — HEPARIN SODIUM 5000 UNIT(S): 5000 INJECTION INTRAVENOUS; SUBCUTANEOUS at 14:16

## 2023-07-20 RX ADMIN — OXYCODONE HYDROCHLORIDE 5 MILLIGRAM(S): 5 TABLET ORAL at 09:57

## 2023-07-20 RX ADMIN — OXYCODONE HYDROCHLORIDE 10 MILLIGRAM(S): 5 TABLET ORAL at 22:30

## 2023-07-20 RX ADMIN — Medication 600 MILLIGRAM(S): at 18:06

## 2023-07-20 RX ADMIN — Medication 975 MILLIGRAM(S): at 16:11

## 2023-07-20 RX ADMIN — Medication 15 MILLIGRAM(S): at 02:02

## 2023-07-20 RX ADMIN — OXYCODONE HYDROCHLORIDE 10 MILLIGRAM(S): 5 TABLET ORAL at 22:01

## 2023-07-20 RX ADMIN — HEPARIN SODIUM 5000 UNIT(S): 5000 INJECTION INTRAVENOUS; SUBCUTANEOUS at 05:03

## 2023-07-20 RX ADMIN — Medication 975 MILLIGRAM(S): at 16:41

## 2023-07-20 RX ADMIN — Medication 975 MILLIGRAM(S): at 12:00

## 2023-07-20 NOTE — PROGRESS NOTE ADULT - SUBJECTIVE AND OBJECTIVE BOX
Anesthesia Pain Management Service    SUBJECTIVE: Pt now off IV PCA without problems reported.  Patient states pain is well controlled and just wants to make sure something more than Tylenol and Motrin is ordered for her pain.  Pain Score: 5-6/10    THERAPY:    [ ] IV PCA Morphine		[ ] 5 mg/mL	[ ] 1 mg/mL  [X ] IV PCA Hydromorphone	[ ] 5 mg/mL	[X ] 1 mg/mL  [ ] IV PCA Fentanyl		[ ] 50 micrograms/mL    Demand dose __0.2_ lockout __6_ (minutes) Continuous Rate _0__ Total: _5.1__  mg used (in past 24 hours)    Therapy:	  [ X] IV PCA	   [ ] Epidural           [ ] s/p Spinal Opoid              [ ] Postpartum infusion	  [ ] Patient controlled regional anesthesia (PCRA)    [ ] prn Analgesics    Allergies  shellfish (Anaphylaxis)  ciprofloxacin (Rash)  Bakbenzylkonium chloride (Swelling)  iodine (Anaphylaxis)    MEDICATIONS  (STANDING):  acetaminophen     Tablet .. 975 milliGRAM(s) Oral every 6 hours  buPROPion XL (24-Hour) . 150 milliGRAM(s) Oral daily  heparin   Injectable 5000 Unit(s) SubCutaneous every 8 hours  ibuprofen  Tablet. 400 milliGRAM(s) Oral every 6 hours  potassium phosphate / sodium phosphate Powder (PHOS-NaK) 1 Packet(s) Oral two times a day    MEDICATIONS  (PRN):  naloxone Injectable 0.1 milliGRAM(s) IV Push every 3 minutes PRN For ANY of the following changes in patient status:  A. RR LESS THAN 10 breaths per minute, B. Oxygen saturation LESS THAN 90%, C. Sedation score of 6  ondansetron Injectable 4 milliGRAM(s) IV Push every 6 hours PRN Nausea  oxyCODONE    IR 2.5 milliGRAM(s) Oral every 4 hours PRN Moderate Pain (4 - 6)  oxyCODONE    IR 5 milliGRAM(s) Oral every 4 hours PRN Severe Pain (7 - 10)      OBJECTIVE:   [X] No new signs     [ ] Other:    Side Effects:  [X ] None			[ ] Other:    Assessment of Catheter Site:		[ ] Intact		[ ] Other:    ASSESSMENT/PLAN  [ ] Continue current therapy    [X ] Therapy changed to:    [ ] IV PCA       [ ] Epidural     [ X] prn Analgesics     Comments: IV PCA discontinued by team and they ordered PRN Oral/IV opioids and/or non-opioid adjuvant analgesics to be used at this point.

## 2023-07-20 NOTE — PROGRESS NOTE ADULT - SUBJECTIVE AND OBJECTIVE BOX
General Surgery Progress Note    POD3 Devlin reversal    O/N: no events    Subjective: Patient resting in bed. She denies fever/chills, n/v, abdominal pain, SOB, CP. +Flatus/-BM. Pain well controlled, she is tolerating diet, and ambulating.      Objective:  Vitals:  T(C): 36.4 (07-20-23 @ 05:50), Max: 37.1 (07-20-23 @ 02:00)  HR: 80 (07-20-23 @ 05:50) (80 - 93)  BP: 113/66 (07-20-23 @ 05:50) (104/51 - 132/71)  RR: 18 (07-20-23 @ 05:50) (17 - 18)  SpO2: 99% (07-20-23 @ 05:50) (98% - 100%)  Wt(kg): --    07-19 @ 07:01  -  07-20 @ 07:00  --------------------------------------------------------  IN:    Lactated Ringers: 1500 mL    Oral Fluid: 580 mL  Total IN: 2080 mL    OUT:    Bulb (mL): 125 mL    Voided (mL): 1200 mL  Total OUT: 1325 mL    Total NET: 755 mL          Physical Exam:  General: WN/WD NAD  Respiratory: CTA B/L  CV: Normal rate regular rhythm  Abdominal: Soft, NT, ND +BS, incisions c/d/i, drain site c/d/i with ss in new  Extremities: No edema, + peripheral pulses        Labs:                        10.3   4.70  )-----------( 162      ( 20 Jul 2023 06:03 )             32.3     07-20    140  |  102  |  6<L>  ----------------------------<  78  3.8   |  27  |  0.48<L>    Ca    9.2      20 Jul 2023 06:03  Phos  3.1     07-20  Mg     1.80     07-20          Urinalysis Basic - ( 20 Jul 2023 06:03 )    Color: x / Appearance: x / SG: x / pH: x  Gluc: 78 mg/dL / Ketone: x  / Bili: x / Urobili: x   Blood: x / Protein: x / Nitrite: x   Leuk Esterase: x / RBC: x / WBC x   Sq Epi: x / Non Sq Epi: x / Bacteria: x

## 2023-07-20 NOTE — PROGRESS NOTE ADULT - ASSESSMENT
A/P: 57 year old female with PMH diverticulitis s/p colostomy, ADHD, heart murmur, HTN presents for ex lap, JASIEL, takedown of colostomy on 7/17    Plan  - Adv to LRD/IVL  - Pain control w PO meds  - Monitor bowel fxn  - OOB  - DVT prophylaxis      A team surgery [id number]

## 2023-07-20 NOTE — PROGRESS NOTE ADULT - SUBJECTIVE AND OBJECTIVE BOX
Anesthesia Pain Management Service- Attending Addendum    SUBJECTIVE: Patient's pain control adequate    Therapy:	  [ X] IV PCA	   [ ] Epidural           [ ] s/p Spinal Opoid              [ ] Postpartum infusion	  [ ] Patient controlled regional anesthesia (PCRA)    [ ] prn Analgesics    Allergies    shellfish (Anaphylaxis)  ciprofloxacin (Rash)  Bakbenzylkonium chloride (Swelling)  iodine (Anaphylaxis)    Intolerances      MEDICATIONS  (STANDING):  acetaminophen     Tablet .. 975 milliGRAM(s) Oral every 6 hours  buPROPion XL (24-Hour) . 150 milliGRAM(s) Oral daily  heparin   Injectable 5000 Unit(s) SubCutaneous every 8 hours  ibuprofen  Tablet. 400 milliGRAM(s) Oral every 6 hours  potassium phosphate / sodium phosphate Powder (PHOS-NaK) 1 Packet(s) Oral two times a day    MEDICATIONS  (PRN):  naloxone Injectable 0.1 milliGRAM(s) IV Push every 3 minutes PRN For ANY of the following changes in patient status:  A. RR LESS THAN 10 breaths per minute, B. Oxygen saturation LESS THAN 90%, C. Sedation score of 6  ondansetron Injectable 4 milliGRAM(s) IV Push every 6 hours PRN Nausea  oxyCODONE    IR 2.5 milliGRAM(s) Oral every 4 hours PRN Moderate Pain (4 - 6)  oxyCODONE    IR 5 milliGRAM(s) Oral every 4 hours PRN Severe Pain (7 - 10)      OBJECTIVE:   [X] No new signs     [ ] Other:    Side Effects:  [X ] None			[ ] Other:      ASSESSMENT/PLAN  -Discontinue current therapy    [ ] Therapy changed to:    [ ] IV PCA       [ ] Epidural     [ X] prn Analgesics     Comments: Pain management per primary team, APS to sign off

## 2023-07-20 NOTE — MEDICAL STUDENT PROGRESS NOTE(EDUCATION) - ASSESSMENT
58 yo female PMH of Heartmann colostomy 3/2023 heart murmur HTN POD 2 from Ladonna reversal recovering well.    Plan:  advance diet to solid foods  d/c PCA transition to oral pain meds  d/c IVF

## 2023-07-20 NOTE — MEDICAL STUDENT PROGRESS NOTE(EDUCATION) - SUBJECTIVE AND OBJECTIVE BOX
GENERAL SURGERY DAILY PROGRESS NOTE:    Interval:  No acute events overnight.    Subjective:  Patient seen and examined. Reports pain is well controlled. Denies N/V. Tolerating diet. Passing flatus, no BM. Ambulating.    Vital Signs Last 24 Hrs  T(C): 36.4 (20 Jul 2023 05:50), Max: 37.1 (20 Jul 2023 02:00)  T(F): 97.5 (20 Jul 2023 05:50), Max: 98.7 (20 Jul 2023 02:00)  HR: 80 (20 Jul 2023 05:50) (80 - 93)  BP: 113/66 (20 Jul 2023 05:50) (104/51 - 132/71)  BP(mean): --  RR: 18 (20 Jul 2023 05:50) (17 - 18)  SpO2: 99% (20 Jul 2023 05:50) (98% - 100%)    Parameters below as of 20 Jul 2023 05:50  Patient On (Oxygen Delivery Method): room air        Exam:  Gen: NAD, resting in bed, alert and responding appropriately  Resp: Airway patent, non-labored respirations  Abd: Soft, ND, NT, no rebound or guarding. Incisions c/d/i. drain outputting serosanguinous fluid.  Ext: No edema, WWP  Neuro: AAOx3, no focal deficits    I&O's Detail    18 Jul 2023 07:01  -  19 Jul 2023 07:00  --------------------------------------------------------  IN:    IV PiggyBack: 400 mL    Lactated Ringers: 3150 mL    Lactated Ringers Bolus: 1000 mL  Total IN: 4550 mL    OUT:    Bulb (mL): 65.5 mL    Indwelling Catheter - Urethral (mL): 1355 mL    Oral Fluid: 0 mL  Total OUT: 1420.5 mL    Total NET: 3129.5 mL      19 Jul 2023 07:01  -  20 Jul 2023 06:50  --------------------------------------------------------  IN:    Lactated Ringers: 1500 mL    Oral Fluid: 580 mL  Total IN: 2080 mL    OUT:    Bulb (mL): 125 mL    Voided (mL): 1200 mL  Total OUT: 1325 mL    Total NET: 755 mL          Daily     Daily     MEDICATIONS  (STANDING):  acetaminophen     Tablet .. 975 milliGRAM(s) Oral every 6 hours  heparin   Injectable 5000 Unit(s) SubCutaneous every 8 hours  HYDROmorphone PCA (1 mG/mL) 30 milliLiter(s) PCA Continuous PCA Continuous  ketorolac   Injectable 15 milliGRAM(s) IV Push every 6 hours  lactated ringers. 1000 milliLiter(s) (75 mL/Hr) IV Continuous <Continuous>    MEDICATIONS  (PRN):  HYDROmorphone PCA (1 mG/mL) Rescue Clinician Bolus 0.5 milliGRAM(s) IV Push every 15 minutes PRN for Pain Scale GREATER THAN 6  naloxone Injectable 0.1 milliGRAM(s) IV Push every 3 minutes PRN For ANY of the following changes in patient status:  A. RR LESS THAN 10 breaths per minute, B. Oxygen saturation LESS THAN 90%, C. Sedation score of 6  ondansetron Injectable 4 milliGRAM(s) IV Push every 6 hours PRN Nausea      LABS:                        9.7    5.31  )-----------( 149      ( 19 Jul 2023 06:16 )             30.8     07-19    136  |  105  |  11  ----------------------------<  84  3.6   |  27  |  0.66    Ca    9.1      19 Jul 2023 06:16  Phos  2.5     07-19  Mg     1.90     07-19        Urinalysis Basic - ( 19 Jul 2023 06:16 )    Color: x / Appearance: x / SG: x / pH: x  Gluc: 84 mg/dL / Ketone: x  / Bili: x / Urobili: x   Blood: x / Protein: x / Nitrite: x   Leuk Esterase: x / RBC: x / WBC x   Sq Epi: x / Non Sq Epi: x / Bacteria: x

## 2023-07-20 NOTE — PROGRESS NOTE ADULT - ATTENDING COMMENTS
DATE OF SERVICE: 07-20-23 @ 17:14    Pain improved, having bowel fx  Abd soft appropriate TTP    LRD  OOB and ambulate  DC plan

## 2023-07-21 LAB
ANION GAP SERPL CALC-SCNC: 11 MMOL/L — SIGNIFICANT CHANGE UP (ref 7–14)
BUN SERPL-MCNC: 7 MG/DL — SIGNIFICANT CHANGE UP (ref 7–23)
CALCIUM SERPL-MCNC: 8.8 MG/DL — SIGNIFICANT CHANGE UP (ref 8.4–10.5)
CHLORIDE SERPL-SCNC: 104 MMOL/L — SIGNIFICANT CHANGE UP (ref 98–107)
CO2 SERPL-SCNC: 27 MMOL/L — SIGNIFICANT CHANGE UP (ref 22–31)
CREAT SERPL-MCNC: 0.56 MG/DL — SIGNIFICANT CHANGE UP (ref 0.5–1.3)
EGFR: 108 ML/MIN/1.73M2 — SIGNIFICANT CHANGE UP
GLUCOSE SERPL-MCNC: 105 MG/DL — HIGH (ref 70–99)
HCT VFR BLD CALC: 30.1 % — LOW (ref 34.5–45)
HGB BLD-MCNC: 9.8 G/DL — LOW (ref 11.5–15.5)
MAGNESIUM SERPL-MCNC: 1.9 MG/DL — SIGNIFICANT CHANGE UP (ref 1.6–2.6)
MCHC RBC-ENTMCNC: 31.5 PG — SIGNIFICANT CHANGE UP (ref 27–34)
MCHC RBC-ENTMCNC: 32.6 GM/DL — SIGNIFICANT CHANGE UP (ref 32–36)
MCV RBC AUTO: 96.8 FL — SIGNIFICANT CHANGE UP (ref 80–100)
NRBC # BLD: 0 /100 WBCS — SIGNIFICANT CHANGE UP (ref 0–0)
NRBC # FLD: 0 K/UL — SIGNIFICANT CHANGE UP (ref 0–0)
PHOSPHATE SERPL-MCNC: 3.8 MG/DL — SIGNIFICANT CHANGE UP (ref 2.5–4.5)
PLATELET # BLD AUTO: 200 K/UL — SIGNIFICANT CHANGE UP (ref 150–400)
POTASSIUM SERPL-MCNC: 3.6 MMOL/L — SIGNIFICANT CHANGE UP (ref 3.5–5.3)
POTASSIUM SERPL-SCNC: 3.6 MMOL/L — SIGNIFICANT CHANGE UP (ref 3.5–5.3)
RBC # BLD: 3.11 M/UL — LOW (ref 3.8–5.2)
RBC # FLD: 12.2 % — SIGNIFICANT CHANGE UP (ref 10.3–14.5)
SODIUM SERPL-SCNC: 142 MMOL/L — SIGNIFICANT CHANGE UP (ref 135–145)
WBC # BLD: 4.12 K/UL — SIGNIFICANT CHANGE UP (ref 3.8–10.5)
WBC # FLD AUTO: 4.12 K/UL — SIGNIFICANT CHANGE UP (ref 3.8–10.5)

## 2023-07-21 RX ORDER — IBUPROFEN 200 MG
1 TABLET ORAL
Qty: 0 | Refills: 0 | DISCHARGE
Start: 2023-07-21

## 2023-07-21 RX ORDER — OXYCODONE HYDROCHLORIDE 5 MG/1
1 TABLET ORAL
Qty: 15 | Refills: 0
Start: 2023-07-21

## 2023-07-21 RX ORDER — POTASSIUM CHLORIDE 20 MEQ
40 PACKET (EA) ORAL ONCE
Refills: 0 | Status: COMPLETED | OUTPATIENT
Start: 2023-07-21 | End: 2023-07-21

## 2023-07-21 RX ORDER — ACETAMINOPHEN 500 MG
3 TABLET ORAL
Qty: 0 | Refills: 0 | DISCHARGE
Start: 2023-07-21

## 2023-07-21 RX ADMIN — Medication 600 MILLIGRAM(S): at 07:58

## 2023-07-21 RX ADMIN — OXYCODONE HYDROCHLORIDE 10 MILLIGRAM(S): 5 TABLET ORAL at 17:38

## 2023-07-21 RX ADMIN — BUPROPION HYDROCHLORIDE 150 MILLIGRAM(S): 150 TABLET, EXTENDED RELEASE ORAL at 11:32

## 2023-07-21 RX ADMIN — Medication 600 MILLIGRAM(S): at 02:17

## 2023-07-21 RX ADMIN — Medication 600 MILLIGRAM(S): at 02:31

## 2023-07-21 RX ADMIN — HEPARIN SODIUM 5000 UNIT(S): 5000 INJECTION INTRAVENOUS; SUBCUTANEOUS at 22:50

## 2023-07-21 RX ADMIN — Medication 600 MILLIGRAM(S): at 14:00

## 2023-07-21 RX ADMIN — Medication 975 MILLIGRAM(S): at 22:50

## 2023-07-21 RX ADMIN — Medication 975 MILLIGRAM(S): at 06:30

## 2023-07-21 RX ADMIN — Medication 40 MILLIEQUIVALENT(S): at 07:58

## 2023-07-21 RX ADMIN — Medication 975 MILLIGRAM(S): at 09:10

## 2023-07-21 RX ADMIN — OXYCODONE HYDROCHLORIDE 10 MILLIGRAM(S): 5 TABLET ORAL at 18:30

## 2023-07-21 RX ADMIN — Medication 600 MILLIGRAM(S): at 13:08

## 2023-07-21 RX ADMIN — OXYCODONE HYDROCHLORIDE 10 MILLIGRAM(S): 5 TABLET ORAL at 10:35

## 2023-07-21 RX ADMIN — Medication 600 MILLIGRAM(S): at 19:14

## 2023-07-21 RX ADMIN — HEPARIN SODIUM 5000 UNIT(S): 5000 INJECTION INTRAVENOUS; SUBCUTANEOUS at 13:08

## 2023-07-21 RX ADMIN — Medication 975 MILLIGRAM(S): at 18:30

## 2023-07-21 RX ADMIN — Medication 975 MILLIGRAM(S): at 06:11

## 2023-07-21 RX ADMIN — Medication 975 MILLIGRAM(S): at 17:39

## 2023-07-21 RX ADMIN — HEPARIN SODIUM 5000 UNIT(S): 5000 INJECTION INTRAVENOUS; SUBCUTANEOUS at 06:12

## 2023-07-21 RX ADMIN — OXYCODONE HYDROCHLORIDE 10 MILLIGRAM(S): 5 TABLET ORAL at 05:32

## 2023-07-21 RX ADMIN — OXYCODONE HYDROCHLORIDE 10 MILLIGRAM(S): 5 TABLET ORAL at 06:00

## 2023-07-21 RX ADMIN — Medication 975 MILLIGRAM(S): at 08:39

## 2023-07-21 RX ADMIN — Medication 975 MILLIGRAM(S): at 23:20

## 2023-07-21 RX ADMIN — Medication 600 MILLIGRAM(S): at 08:30

## 2023-07-21 RX ADMIN — OXYCODONE HYDROCHLORIDE 10 MILLIGRAM(S): 5 TABLET ORAL at 10:04

## 2023-07-21 NOTE — PROGRESS NOTE ADULT - ASSESSMENT
A/P: 57 year old female with PMH diverticulitis s/p colostomy, ADHD, heart murmur, HTN presents for ex lap, JASIEL, takedown of colostomy on 7/17    Plan  - Tolerating LRD  - Pain control w PO meds  - Monitor bowel fxn +/-  - OOB  - DVT prophylaxis  - Dc Planning      A team surgery [id number]

## 2023-07-21 NOTE — PROGRESS NOTE ADULT - SUBJECTIVE AND OBJECTIVE BOX
General Surgery Progress Note    POD 4 Devlin reversal    O/N: No events    Subjective: Patient resting in bed. She feels well and her pain is well controlled. She denies, fever/chills, CP, SOB, n/v, abdominal pain. She is voiding and +/-. OOB. ambulating, and tolerating diet without issue.     Objective:  Vitals:  T(C): 36.7 (07-21-23 @ 07:18), Max: 36.7 (07-20-23 @ 09:56)  HR: 79 (07-21-23 @ 07:18) (79 - 96)  BP: 103/65 (07-21-23 @ 07:18) (103/65 - 146/88)  RR: 18 (07-21-23 @ 07:18) (17 - 18)  SpO2: 100% (07-21-23 @ 07:18) (98% - 100%)  Wt(kg): --    07-20 @ 07:01  -  07-21 @ 07:00  --------------------------------------------------------  IN:    Oral Fluid: 995 mL  Total IN: 995 mL    OUT:    Bulb (mL): 110 mL    Voided (mL): 1000 mL  Total OUT: 1110 mL    Total NET: -115 mL          Physical Exam:  General: WN/WD NAD  Respiratory: CTA B/L  CV: Normal rate regular rhythm  Abdominal: Soft, NT, ND +BS, midline incision cdi, YONATHAN site cdi with ss in bulb  Extremities: No edema, + peripheral pulses        Labs:                        9.8    4.12  )-----------( 200      ( 21 Jul 2023 05:45 )             30.1     07-21    142  |  104  |  7   ----------------------------<  105<H>  3.6   |  27  |  0.56    Ca    8.8      21 Jul 2023 05:45  Phos  3.8     07-21  Mg     1.90     07-21          Urinalysis Basic - ( 21 Jul 2023 05:45 )    Color: x / Appearance: x / SG: x / pH: x  Gluc: 105 mg/dL / Ketone: x  / Bili: x / Urobili: x   Blood: x / Protein: x / Nitrite: x   Leuk Esterase: x / RBC: x / WBC x   Sq Epi: x / Non Sq Epi: x / Bacteria: x              A/P: 57yFemale   - Monitor GI function  - encourage OOB/IS  - SCD/ DVT PPX  - monitor labs, replete electrolytes as needed  - Pain control   General Surgery Progress Note    POD 4 Devlin reversal    O/N: No events    Subjective: Patient resting in bed. She feels well and her pain is well controlled. She denies, fever/chills, CP, SOB, n/v, abdominal pain. She is voiding and +/-. OOB. ambulating, and tolerating diet without issue.     Objective:  Vitals:  T(C): 36.7 (07-21-23 @ 07:18), Max: 36.7 (07-20-23 @ 09:56)  HR: 79 (07-21-23 @ 07:18) (79 - 96)  BP: 103/65 (07-21-23 @ 07:18) (103/65 - 146/88)  RR: 18 (07-21-23 @ 07:18) (17 - 18)  SpO2: 100% (07-21-23 @ 07:18) (98% - 100%)  Wt(kg): --    07-20 @ 07:01  -  07-21 @ 07:00  --------------------------------------------------------  IN:    Oral Fluid: 995 mL  Total IN: 995 mL    OUT:    Bulb (mL): 110 mL    Voided (mL): 1000 mL  Total OUT: 1110 mL    Total NET: -115 mL          Physical Exam:  General: WN/WD NAD  Respiratory: CTA B/L  CV: Normal rate regular rhythm  Abdominal: Soft, NT, ND +BS, midline incision cdi, YONATHAN site cdi with ss in bulb  Extremities: No edema, + peripheral pulses        Labs:                        9.8    4.12  )-----------( 200      ( 21 Jul 2023 05:45 )             30.1     07-21    142  |  104  |  7   ----------------------------<  105<H>  3.6   |  27  |  0.56    Ca    8.8      21 Jul 2023 05:45  Phos  3.8     07-21  Mg     1.90     07-21          Urinalysis Basic - ( 21 Jul 2023 05:45 )    Color: x / Appearance: x / SG: x / pH: x  Gluc: 105 mg/dL / Ketone: x  / Bili: x / Urobili: x   Blood: x / Protein: x / Nitrite: x   Leuk Esterase: x / RBC: x / WBC x   Sq Epi: x / Non Sq Epi: x / Bacteria: x

## 2023-07-22 ENCOUNTER — TRANSCRIPTION ENCOUNTER (OUTPATIENT)
Age: 58
End: 2023-07-22

## 2023-07-22 VITALS
HEART RATE: 70 BPM | RESPIRATION RATE: 18 BRPM | TEMPERATURE: 98 F | SYSTOLIC BLOOD PRESSURE: 129 MMHG | OXYGEN SATURATION: 95 % | DIASTOLIC BLOOD PRESSURE: 85 MMHG

## 2023-07-22 LAB
ANION GAP SERPL CALC-SCNC: 5 MMOL/L — LOW (ref 7–14)
BUN SERPL-MCNC: 7 MG/DL — SIGNIFICANT CHANGE UP (ref 7–23)
CALCIUM SERPL-MCNC: 9 MG/DL — SIGNIFICANT CHANGE UP (ref 8.4–10.5)
CHLORIDE SERPL-SCNC: 106 MMOL/L — SIGNIFICANT CHANGE UP (ref 98–107)
CO2 SERPL-SCNC: 27 MMOL/L — SIGNIFICANT CHANGE UP (ref 22–31)
CREAT SERPL-MCNC: 0.49 MG/DL — LOW (ref 0.5–1.3)
EGFR: 111 ML/MIN/1.73M2 — SIGNIFICANT CHANGE UP
GLUCOSE SERPL-MCNC: 83 MG/DL — SIGNIFICANT CHANGE UP (ref 70–99)
HCT VFR BLD CALC: 30.3 % — LOW (ref 34.5–45)
HGB BLD-MCNC: 10.2 G/DL — LOW (ref 11.5–15.5)
MAGNESIUM SERPL-MCNC: 2 MG/DL — SIGNIFICANT CHANGE UP (ref 1.6–2.6)
MCHC RBC-ENTMCNC: 32.3 PG — SIGNIFICANT CHANGE UP (ref 27–34)
MCHC RBC-ENTMCNC: 33.7 GM/DL — SIGNIFICANT CHANGE UP (ref 32–36)
MCV RBC AUTO: 95.9 FL — SIGNIFICANT CHANGE UP (ref 80–100)
NRBC # BLD: 0 /100 WBCS — SIGNIFICANT CHANGE UP (ref 0–0)
NRBC # FLD: 0 K/UL — SIGNIFICANT CHANGE UP (ref 0–0)
PHOSPHATE SERPL-MCNC: 4.1 MG/DL — SIGNIFICANT CHANGE UP (ref 2.5–4.5)
PLATELET # BLD AUTO: 221 K/UL — SIGNIFICANT CHANGE UP (ref 150–400)
POTASSIUM SERPL-MCNC: 3.6 MMOL/L — SIGNIFICANT CHANGE UP (ref 3.5–5.3)
POTASSIUM SERPL-SCNC: 3.6 MMOL/L — SIGNIFICANT CHANGE UP (ref 3.5–5.3)
RBC # BLD: 3.16 M/UL — LOW (ref 3.8–5.2)
RBC # FLD: 12.2 % — SIGNIFICANT CHANGE UP (ref 10.3–14.5)
SODIUM SERPL-SCNC: 138 MMOL/L — SIGNIFICANT CHANGE UP (ref 135–145)
WBC # BLD: 5.02 K/UL — SIGNIFICANT CHANGE UP (ref 3.8–10.5)
WBC # FLD AUTO: 5.02 K/UL — SIGNIFICANT CHANGE UP (ref 3.8–10.5)

## 2023-07-22 RX ORDER — OXYCODONE HYDROCHLORIDE 5 MG/1
1 TABLET ORAL
Qty: 15 | Refills: 0
Start: 2023-07-22

## 2023-07-22 RX ADMIN — OXYCODONE HYDROCHLORIDE 10 MILLIGRAM(S): 5 TABLET ORAL at 05:22

## 2023-07-22 RX ADMIN — OXYCODONE HYDROCHLORIDE 10 MILLIGRAM(S): 5 TABLET ORAL at 07:21

## 2023-07-22 RX ADMIN — OXYCODONE HYDROCHLORIDE 10 MILLIGRAM(S): 5 TABLET ORAL at 11:00

## 2023-07-22 RX ADMIN — Medication 600 MILLIGRAM(S): at 03:45

## 2023-07-22 RX ADMIN — HEPARIN SODIUM 5000 UNIT(S): 5000 INJECTION INTRAVENOUS; SUBCUTANEOUS at 05:22

## 2023-07-22 RX ADMIN — Medication 600 MILLIGRAM(S): at 04:15

## 2023-07-22 RX ADMIN — Medication 975 MILLIGRAM(S): at 07:21

## 2023-07-22 RX ADMIN — Medication 600 MILLIGRAM(S): at 10:11

## 2023-07-22 RX ADMIN — Medication 600 MILLIGRAM(S): at 08:55

## 2023-07-22 RX ADMIN — Medication 975 MILLIGRAM(S): at 11:00

## 2023-07-22 RX ADMIN — Medication 600 MILLIGRAM(S): at 09:51

## 2023-07-22 RX ADMIN — Medication 975 MILLIGRAM(S): at 07:03

## 2023-07-22 RX ADMIN — OXYCODONE HYDROCHLORIDE 10 MILLIGRAM(S): 5 TABLET ORAL at 00:11

## 2023-07-22 RX ADMIN — OXYCODONE HYDROCHLORIDE 10 MILLIGRAM(S): 5 TABLET ORAL at 07:22

## 2023-07-22 NOTE — PROGRESS NOTE ADULT - PROVIDER SPECIALTY LIST ADULT
Pain Medicine
Surgery
Surgery
Colorectal Surgery
Surgery
Surgery

## 2023-07-22 NOTE — PROGRESS NOTE ADULT - SUBJECTIVE AND OBJECTIVE BOX
General Surgery Progress Note    POD 5     O/N: no events     Subjective: Patient resting in bed. No new issues, no n/v, +flatus/+BM, pain well controlled, ambulating, and voiding.      Objective:  Vitals:  T(C): 36.4 (07-22-23 @ 05:00), Max: 36.9 (07-21-23 @ 10:14)  HR: 79 (07-22-23 @ 05:00) (79 - 101)  BP: 110/72 (07-22-23 @ 05:00) (110/72 - 140/77)  RR: 18 (07-22-23 @ 05:00) (17 - 18)  SpO2: 97% (07-22-23 @ 05:00) (96% - 100%)  Wt(kg): --    07-21 @ 07:01  -  07-22 @ 07:00  --------------------------------------------------------  IN:    Oral Fluid: 1200 mL  Total IN: 1200 mL    OUT:    Bulb (mL): 130 mL    IV PiggyBack: 0 mL  Total OUT: 130 mL    Total NET: 1070 mL          Physical Exam:  General: WN/WD NAD  Respiratory: CTA B/L  CV: Normal rate regular rhythm  Abdominal: Soft, NT, ND +BS, incision site cdi, YONATHAN site cdi with ss collection in bulb   Extremities: No edema, + peripheral pulses        Labs:                        10.2   5.02  )-----------( 221      ( 22 Jul 2023 05:55 )             30.3     07-22    138  |  106  |  7   ----------------------------<  83  3.6   |  27  |  0.49<L>    Ca    9.0      22 Jul 2023 05:55  Phos  4.1     07-22  Mg     2.00     07-22          Urinalysis Basic - ( 22 Jul 2023 05:55 )    Color: x / Appearance: x / SG: x / pH: x  Gluc: 83 mg/dL / Ketone: x  / Bili: x / Urobili: x   Blood: x / Protein: x / Nitrite: x   Leuk Esterase: x / RBC: x / WBC x   Sq Epi: x / Non Sq Epi: x / Bacteria: x

## 2023-07-22 NOTE — DISCHARGE NOTE NURSING/CASE MANAGEMENT/SOCIAL WORK - PATIENT PORTAL LINK FT
You can access the FollowMyHealth Patient Portal offered by [facility] by registering at the following website: [email or web address]. By joining [employer]’s FollowMyHealth portal, you will also be able to view your health information using other applications (apps) compatible with our system.

## 2023-07-22 NOTE — PROGRESS NOTE ADULT - ASSESSMENT
A/P: 57 year old female with PMH diverticulitis s/p colostomy, ADHD, heart murmur, HTN presents for ex lap, JASIEL, takedown of colostomy on 7/17    Plan  - Tolerating LRD  - Pain control w PO meds  - OOB  - DVT prophylaxis  - Dc Planning

## 2023-07-23 LAB
CULTURE RESULTS: SIGNIFICANT CHANGE UP
ORGANISM # SPEC MICROSCOPIC CNT: SIGNIFICANT CHANGE UP
ORGANISM # SPEC MICROSCOPIC CNT: SIGNIFICANT CHANGE UP
SPECIMEN SOURCE: SIGNIFICANT CHANGE UP

## 2023-08-02 RX ORDER — ONDANSETRON 8 MG/1
1 TABLET, FILM COATED ORAL
Qty: 18 | Refills: 0
Start: 2023-08-02

## 2023-08-02 RX ORDER — OXYCODONE HYDROCHLORIDE 5 MG/1
1 TABLET ORAL
Qty: 18 | Refills: 0
Start: 2023-08-02

## 2023-08-02 RX ORDER — PANTOPRAZOLE SODIUM 20 MG/1
1 TABLET, DELAYED RELEASE ORAL
Qty: 30 | Refills: 2
Start: 2023-08-02 | End: 2023-10-30

## 2023-08-18 LAB — SURGICAL PATHOLOGY STUDY: SIGNIFICANT CHANGE UP

## 2023-09-23 ENCOUNTER — NON-APPOINTMENT (OUTPATIENT)
Age: 58
End: 2023-09-23

## 2023-09-24 ENCOUNTER — EMERGENCY (EMERGENCY)
Facility: HOSPITAL | Age: 58
LOS: 1 days | Discharge: LEFT BEFORE DISCHARGE | End: 2023-09-24
Attending: EMERGENCY MEDICINE | Admitting: EMERGENCY MEDICINE
Payer: COMMERCIAL

## 2023-09-24 VITALS
SYSTOLIC BLOOD PRESSURE: 136 MMHG | HEART RATE: 107 BPM | OXYGEN SATURATION: 100 % | RESPIRATION RATE: 18 BRPM | DIASTOLIC BLOOD PRESSURE: 75 MMHG | TEMPERATURE: 99 F

## 2023-09-24 VITALS
OXYGEN SATURATION: 97 % | TEMPERATURE: 98 F | HEART RATE: 78 BPM | RESPIRATION RATE: 20 BRPM | SYSTOLIC BLOOD PRESSURE: 117 MMHG | DIASTOLIC BLOOD PRESSURE: 80 MMHG

## 2023-09-24 DIAGNOSIS — Z98.890 OTHER SPECIFIED POSTPROCEDURAL STATES: Chronic | ICD-10-CM

## 2023-09-24 DIAGNOSIS — Z93.3 COLOSTOMY STATUS: Chronic | ICD-10-CM

## 2023-09-24 LAB
ALBUMIN SERPL ELPH-MCNC: 4.9 G/DL — SIGNIFICANT CHANGE UP (ref 3.3–5)
ALP SERPL-CCNC: 139 U/L — HIGH (ref 40–120)
ALT FLD-CCNC: 42 U/L — HIGH (ref 4–33)
ANION GAP SERPL CALC-SCNC: 16 MMOL/L — HIGH (ref 7–14)
AST SERPL-CCNC: 48 U/L — HIGH (ref 4–32)
BASOPHILS # BLD AUTO: 0.09 K/UL — SIGNIFICANT CHANGE UP (ref 0–0.2)
BASOPHILS NFR BLD AUTO: 1.3 % — SIGNIFICANT CHANGE UP (ref 0–2)
BILIRUB SERPL-MCNC: 0.4 MG/DL — SIGNIFICANT CHANGE UP (ref 0.2–1.2)
BUN SERPL-MCNC: 16 MG/DL — SIGNIFICANT CHANGE UP (ref 7–23)
CALCIUM SERPL-MCNC: 9.1 MG/DL — SIGNIFICANT CHANGE UP (ref 8.4–10.5)
CHLORIDE SERPL-SCNC: 99 MMOL/L — SIGNIFICANT CHANGE UP (ref 98–107)
CO2 SERPL-SCNC: 25 MMOL/L — SIGNIFICANT CHANGE UP (ref 22–31)
CREAT SERPL-MCNC: 0.56 MG/DL — SIGNIFICANT CHANGE UP (ref 0.5–1.3)
EGFR: 107 ML/MIN/1.73M2 — SIGNIFICANT CHANGE UP
EOSINOPHIL # BLD AUTO: 0.02 K/UL — SIGNIFICANT CHANGE UP (ref 0–0.5)
EOSINOPHIL NFR BLD AUTO: 0.3 % — SIGNIFICANT CHANGE UP (ref 0–6)
GLUCOSE SERPL-MCNC: 81 MG/DL — SIGNIFICANT CHANGE UP (ref 70–99)
HCT VFR BLD CALC: 37.8 % — SIGNIFICANT CHANGE UP (ref 34.5–45)
HGB BLD-MCNC: 12.4 G/DL — SIGNIFICANT CHANGE UP (ref 11.5–15.5)
IANC: 3.57 K/UL — SIGNIFICANT CHANGE UP (ref 1.8–7.4)
IMM GRANULOCYTES NFR BLD AUTO: 0.1 % — SIGNIFICANT CHANGE UP (ref 0–0.9)
LYMPHOCYTES # BLD AUTO: 2.89 K/UL — SIGNIFICANT CHANGE UP (ref 1–3.3)
LYMPHOCYTES # BLD AUTO: 41.9 % — SIGNIFICANT CHANGE UP (ref 13–44)
MCHC RBC-ENTMCNC: 30.9 PG — SIGNIFICANT CHANGE UP (ref 27–34)
MCHC RBC-ENTMCNC: 32.8 GM/DL — SIGNIFICANT CHANGE UP (ref 32–36)
MCV RBC AUTO: 94.3 FL — SIGNIFICANT CHANGE UP (ref 80–100)
MONOCYTES # BLD AUTO: 0.31 K/UL — SIGNIFICANT CHANGE UP (ref 0–0.9)
MONOCYTES NFR BLD AUTO: 4.5 % — SIGNIFICANT CHANGE UP (ref 2–14)
NEUTROPHILS # BLD AUTO: 3.57 K/UL — SIGNIFICANT CHANGE UP (ref 1.8–7.4)
NEUTROPHILS NFR BLD AUTO: 51.9 % — SIGNIFICANT CHANGE UP (ref 43–77)
NRBC # BLD: 0 /100 WBCS — SIGNIFICANT CHANGE UP (ref 0–0)
NRBC # FLD: 0 K/UL — SIGNIFICANT CHANGE UP (ref 0–0)
PLATELET # BLD AUTO: 255 K/UL — SIGNIFICANT CHANGE UP (ref 150–400)
POTASSIUM SERPL-MCNC: 3.7 MMOL/L — SIGNIFICANT CHANGE UP (ref 3.5–5.3)
POTASSIUM SERPL-SCNC: 3.7 MMOL/L — SIGNIFICANT CHANGE UP (ref 3.5–5.3)
PROT SERPL-MCNC: 7.7 G/DL — SIGNIFICANT CHANGE UP (ref 6–8.3)
RBC # BLD: 4.01 M/UL — SIGNIFICANT CHANGE UP (ref 3.8–5.2)
RBC # FLD: 13.6 % — SIGNIFICANT CHANGE UP (ref 10.3–14.5)
SODIUM SERPL-SCNC: 140 MMOL/L — SIGNIFICANT CHANGE UP (ref 135–145)
TSH SERPL-MCNC: 1.43 UIU/ML — SIGNIFICANT CHANGE UP (ref 0.27–4.2)
WBC # BLD: 6.89 K/UL — SIGNIFICANT CHANGE UP (ref 3.8–10.5)
WBC # FLD AUTO: 6.89 K/UL — SIGNIFICANT CHANGE UP (ref 3.8–10.5)

## 2023-09-24 PROCEDURE — 70450 CT HEAD/BRAIN W/O DYE: CPT | Mod: 26,MA

## 2023-09-24 PROCEDURE — 93010 ELECTROCARDIOGRAM REPORT: CPT

## 2023-09-24 PROCEDURE — 99285 EMERGENCY DEPT VISIT HI MDM: CPT

## 2023-09-24 RX ORDER — METOCLOPRAMIDE HCL 10 MG
10 TABLET ORAL ONCE
Refills: 0 | Status: COMPLETED | OUTPATIENT
Start: 2023-09-24 | End: 2023-09-24

## 2023-09-24 RX ORDER — ACETAMINOPHEN 500 MG
1000 TABLET ORAL ONCE
Refills: 0 | Status: COMPLETED | OUTPATIENT
Start: 2023-09-24 | End: 2023-09-24

## 2023-09-24 RX ORDER — SODIUM CHLORIDE 9 MG/ML
1000 INJECTION INTRAMUSCULAR; INTRAVENOUS; SUBCUTANEOUS ONCE
Refills: 0 | Status: COMPLETED | OUTPATIENT
Start: 2023-09-24 | End: 2023-09-24

## 2023-09-24 RX ADMIN — SODIUM CHLORIDE 1000 MILLILITER(S): 9 INJECTION INTRAMUSCULAR; INTRAVENOUS; SUBCUTANEOUS at 19:31

## 2023-09-24 RX ADMIN — Medication 10 MILLIGRAM(S): at 19:31

## 2023-09-24 RX ADMIN — Medication 400 MILLIGRAM(S): at 19:32

## 2023-09-24 NOTE — ED ADULT NURSE NOTE - CHPI ED NUR SYMPTOMS NEG
no blurred vision/no change in level of consciousness/no loss of consciousness/no nausea/no numbness/no vomiting/no weakness

## 2023-09-24 NOTE — ED PROVIDER NOTE - PROGRESS NOTE DETAILS
Sergei Gloria MD PGY-2: Spoke to  in private.  States that he has caught his wife drinking alcohol around noon time on his daughter's 18th birthday recently.  Also states that his kids are prescribed Xanax however there are times where pills are missing and so he suspects that his wife may be taking the Xanax.  This is why he requested drug testing for his wife. Dave, PGY3 - Received sign-out on patient. Patient hx TIA, presenting for AMS x3 days, recently diagnosed with COVID. waiting for labs, CTH, patient to ultimately get a neuro consult. Current symptoms different from prior TIA where patient was having visual hallucinations Dave, PGY3 - CTH nonactionable. waiting for UA. neurology paged Dave, PGY3 - consulted neurology, will come see patient. Dave, PGY3 - came back after completing a  eval and was told by nursing that patient eloped, pulled IV.

## 2023-09-24 NOTE — ED ADULT NURSE REASSESSMENT NOTE - NS ED NURSE REASSESS COMMENT FT1
Received report from Day HENRY josue. Patient received in room 16. A&O4. Ambulatory. Respirations even and unlabored. No signs of acute distress noted. Comfort and safety maintained. Stretcher in lowest position. Call bell within reach.

## 2023-09-24 NOTE — ED PROVIDER NOTE - PHYSICAL EXAMINATION
GENERAL: well appearing in no acute distress, non-toxic appearing  HEAD: normocephalic, atraumatic  HEENT: normal conjunctiva, oral mucosa moist, neck ROM full, no meningismus, no JVD  CARDIAC: regular rate and rhythm, normal S1S2, no appreciable murmurs, 2+ pulses in UE b/l  PULM: normal breath sounds, clear to ascultation bilaterally, no rales, rhonchi, wheezing  GI: abdomen nondistended, soft, nontender, no guarding, rebound tenderness  : no suprapubic tenderness  NEURO: no gross motor or sensory deficits, CN2-12 grossly intact, normal speech, PERRL, EOMI, normal gait, AAOx4  MSK: no peripheral edema  SKIN: well-perfused, extremities warm, no visible rashes  PSYCH: anxious mood and affect

## 2023-09-24 NOTE — ED ADULT NURSE NOTE - OBJECTIVE STATEMENT
pt 59y/o female as per  pt not acting herself for last 48hrs, pt COVID positive, placed in isolation, pt AAOx4, breathing room air abd soft ntnd x 4 quads moving all extremities motor sensory intact no slurred speech no facial assymetry, pt reports feeling very tired although she is getting plenty of rest, no nausea no vomiting no diarrhea no cough, VS as noted hx anxiety, colon resection s/p colostomy. EKG on arrival sinus tachycardia, pt on fall precautions pending re eval

## 2023-09-24 NOTE — ED ADULT TRIAGE NOTE - CHIEF COMPLAINT QUOTE
Pt presents to ED ambulatory from home with c/o altered mental status x 3 days. Pt reports feeling "foggy", pt's  reports she forgot today was Sunday, would repeat herself several times. Pt tested positive for COVID 2 days ago. Pt endorses headache and sensitivity to light, and generalized weakness.

## 2023-09-24 NOTE — ED PROVIDER NOTE - OBJECTIVE STATEMENT
58-year-old female with PMH diverticulitis complicated by perforation March 2023 with repair surgery in July 2023, remote TIA, anxiety on wellbutrin presents for 3 days of altered mental status.  Patient presents with  at bedside.  History obtained from both patient and .  Patient had COVID symptoms 1 week ago which were resolving this past Friday.  Patient went to urgent care on Friday and was prescribed Paxlovid.  Patient took 2 doses of Paxlovid on Friday and 1 on Saturday.   states that the patient has been disoriented, had couple hours of slurred speech late Friday afternoon, and has been repeating phrases and not making sense on Saturday.  Today the patient has been sleeping all day on and off.  Patient went to urgent care earlier today for blood in her urine and had a rectal exam which was reported to be normal and a negative guaiac and were was instructed to go to the emergency department for concern and rule out of stroke.  Denies alcohol use, recreational drug use, smoking.  Patient used to smoke cigarettes but quit years ago.  Patient examined in private and again declined any alcohol or drug use while her  was outside the room.   expressed some concern and asked for patient to be tested for any drug use.  When asked to explain why,  states that the patient just has not been herself for the past 3 days and he is suspicious that something else is going on.  Patient also endorsing 2 days of intermittent frontal headache that feels similar to prior headaches she has had however this 1 is more severe.  Denies neck pain or neck stiffness.  Denies being outdoors, recent travel, any bug bites, or abnormal rashes.  Denies fevers, nausea, vomiting, chest pain, shortness of breath, constipation, diarrhea. Denies SI/HI, hallucinations. 58-year-old female with PMH diverticulitis complicated by perforation March 2023 with repair surgery in July 2023, remote TIA, anxiety on wellbutrin presents for 3 days of altered mental status.  Patient presents with  at bedside.  History obtained from both patient and .  Patient had COVID symptoms 1 week ago which were resolving this past Friday.  Patient went to urgent care on Friday and was prescribed Paxlovid.  Patient took 2 doses of Paxlovid on Friday and 1 on Saturday.   states that the patient has been disoriented, had couple hours of slurred speech late Friday afternoon, and has been repeating phrases and not making sense on Saturday.  Today the patient has been sleeping all day on and off.  Patient went to urgent care earlier today for blood in her urine and had a rectal exam which was reported to be normal and a negative guaiac and were was instructed to go to the emergency department for concern and rule out of stroke.  Denies alcohol use, recreational drug use, smoking.  Patient used to smoke cigarettes but quit years ago.  Patient examined in private and again declined any alcohol or drug use while her  was outside the room.   expressed some concern and asked for patient to be tested for any drug use.  When asked to explain why,  states that the patient just has not been herself for the past 3 days and he is suspicious that something else is going on.  Patient also endorsing 2 days of intermittent frontal headache that feels similar to prior headaches she has had however this 1 is more severe.  Denies neck pain or neck stiffness.  Denies being outdoors, recent travel, any bug bites, or abnormal rashes.  Denies fevers, nausea, vomiting, chest pain, shortness of breath, constipation, diarrhea. Denies SI/HI, hallucinations. Prior TIA was 15 years ago - pt had some visual hallucinations thought cows wwere out in the field while in the car with  when no cows were actually there, later had syncope, had CTH and MRI at [facility] at the time no conclusive results but were told that the doctors suspected TIA.

## 2023-09-24 NOTE — ED PROVIDER NOTE - ATTENDING CONTRIBUTION TO CARE
Brief HPI:  58-year-old female past medical history diverticulitis complicated by perforation March 2023 status post bowel resection with ostomy and subsequent reversal, remote TIA, anxiety and Wellbutrin presents with episodes of "brain fog", and episode of slurred speech with word finding difficulty.  Patient reports URI like illness approximately 1 week ago which resolved 2 days ago.  She tested positive for SARS Cov 2 on 9/22/2023 and was prescribed Paxlovid.  Patient reports approximately 9 PM on 9/22/2023 she had episode of hours long slurred speech and word finding difficulty.  She went to bed and this resolved upon waking the next day.  Since that time she reports brain feeling "foggy".  She also reports frontal headache, nonacute onset, not maximal in onset, associated with photophobia but not neck stiffness.  Patient denies alcohol or illicit drug use.  Patient denies visual changes, nausea, vomiting, facial droop, numbness, tingling, focal extremity weakness, abdominal pain, dysuria.    Vitals:   Reviewed    Exam:    GEN:  Non-toxic appearing, non-distressed, speaking full sentences, non-diaphoretic, AAOx3  HEENT:  NCAT, neck supple, EOMI, PERRLA, sclera anicteric, no conjunctival pallor or injection, no stridor, normal voice, no tonsillar exudate, uvula midline  CV:  regular rhythm and rate, s1/s2 audible, no murmurs, rubs or gallops, peripheral pulses 2+ and symmetric  PULM:  non-labored respirations, lungs clear to auscultation bilaterally, no wheezes, crackles or rales  ABD:  non distended, non-tender, no rebound, no guarding, negative Bledsoe's sign, bowel sounds normal, no cvat  MSK:  no gross deformity, non-tender extremities and joints, range of motion grossly normal appearing, no extremity edema, extremities warm and well perfused   NEURO:  AAOx3, CN II-XII intact, motor 5/5 in upper and lower extremities bilaterally, sensation grossly intact in extremities and trunk, finger to nose testing wnl, no nystagmus, negative Romberg, no pronator drift, no gait deficit  SKIN:  warm, dry, no rash or vesicles     A/P:  58-year-old female past medical history diverticulitis complicated by perforation March 2023 status post bowel resection with ostomy and subsequent reversal, remote TIA, anxiety and Wellbutrin presents with episodes of "brain fog", and episode of slurred speech with word finding difficulty.  Vital signs stable, afebrile.  Patient ANO x4, no focal neurodeficits.  NIH stroke scale 0.  Does not appear altered at this time.  Low concern for subarachnoid hemorrhage as headache not acute in onset, not maximal in onset, and is without associated neck stiffness.  Low concern for meningitis as patient without fever, or neck stiffness.  Suspect primary headache syndrome.  Regarding episode of word finding difficulty and dysarthria, low concern for TIA overall given low ABCD2 score, however with history of TIA will obtain labs, CT head, neurology consult.  Disposition pending.

## 2023-09-24 NOTE — ED PROVIDER NOTE - CLINICAL SUMMARY MEDICAL DECISION MAKING FREE TEXT BOX
58-year-old female with PMH TIA, diverticulitis, anxiety on Wellbutrin presents for 3 days of AMS in the setting of starting Paxlovid.  Exam shows no focal neurologic deficits, no SI HI or hallucinations, cranial nerves II to XII grossly intact.  Will obtain labs, CT head Noncon, will hold off on CT a head and neck with IV contrast due to reported contrast allergy, UA/UC, neuro consult for TIA evaluation, analgesia.  Dispo pending results.

## 2023-09-24 NOTE — ED ADULT NURSE NOTE - NSFALLHARMRISKINTERV_ED_ALL_ED
Assistance OOB with selected safe patient handling equipment if applicable/Communicate risk of Fall with Harm to all staff, patient, and family/Provide visual cue: red socks, yellow wristband, yellow gown, etc/Reinforce activity limits and safety measures with patient and family/Bed in lowest position, wheels locked, appropriate side rails in place/Call bell, personal items and telephone in reach/Instruct patient to call for assistance before getting out of bed/chair/stretcher/Non-slip footwear applied when patient is off stretcher/[place] to call system/Physically safe environment - no spills, clutter or unnecessary equipment/Purposeful Proactive Rounding/Room/bathroom lighting operational, light cord in reach

## 2023-09-25 LAB
AMPHET UR-MCNC: POSITIVE
APAP SERPL-MCNC: <10 UG/ML — LOW (ref 15–25)
BARBITURATES UR SCN-MCNC: NEGATIVE — SIGNIFICANT CHANGE UP
BENZODIAZ UR-MCNC: NEGATIVE — SIGNIFICANT CHANGE UP
COCAINE METAB.OTHER UR-MCNC: NEGATIVE — SIGNIFICANT CHANGE UP
CREATININE URINE RESULT, DAU: 41 MG/DL — SIGNIFICANT CHANGE UP
ETHANOL SERPL-MCNC: <10 MG/DL — SIGNIFICANT CHANGE UP
METHADONE UR-MCNC: NEGATIVE — SIGNIFICANT CHANGE UP
OPIATES UR-MCNC: NEGATIVE — SIGNIFICANT CHANGE UP
OXYCODONE UR-MCNC: NEGATIVE — SIGNIFICANT CHANGE UP
PCP SPEC-MCNC: SIGNIFICANT CHANGE UP
PCP UR-MCNC: NEGATIVE — SIGNIFICANT CHANGE UP
SALICYLATES SERPL-MCNC: <0.3 MG/DL — LOW (ref 15–30)
THC UR QL: NEGATIVE — SIGNIFICANT CHANGE UP
TOXICOLOGY SCREEN, DRUGS OF ABUSE, SERUM RESULT: SIGNIFICANT CHANGE UP

## 2023-09-26 LAB
CULTURE RESULTS: SIGNIFICANT CHANGE UP
SPECIMEN SOURCE: SIGNIFICANT CHANGE UP

## 2025-02-07 ENCOUNTER — NON-APPOINTMENT (OUTPATIENT)
Age: 60
End: 2025-02-07

## 2025-02-08 ENCOUNTER — APPOINTMENT (OUTPATIENT)
Dept: ORTHOPEDIC SURGERY | Facility: CLINIC | Age: 60
End: 2025-02-08
Payer: COMMERCIAL

## 2025-02-08 VITALS — BODY MASS INDEX: 23.04 KG/M2 | WEIGHT: 130 LBS | HEIGHT: 63 IN

## 2025-02-08 DIAGNOSIS — S62.357A NONDISPLACED FRACTURE OF SHAFT OF FIFTH METACARPAL BONE, LEFT HAND, INITIAL ENCOUNTER FOR CLOSED FRACTURE: ICD-10-CM

## 2025-02-08 PROCEDURE — 73100 X-RAY EXAM OF WRIST: CPT | Mod: LT

## 2025-02-08 PROCEDURE — 99213 OFFICE O/P EST LOW 20 MIN: CPT | Mod: 25

## 2025-02-08 PROCEDURE — L3908: CPT | Mod: LT

## 2025-02-25 ENCOUNTER — APPOINTMENT (OUTPATIENT)
Dept: ORTHOPEDIC SURGERY | Facility: CLINIC | Age: 60
End: 2025-02-25
Payer: COMMERCIAL

## 2025-02-25 VITALS — HEIGHT: 63 IN | WEIGHT: 130 LBS | BODY MASS INDEX: 23.04 KG/M2

## 2025-02-25 DIAGNOSIS — S62.357A NONDISPLACED FRACTURE OF SHAFT OF FIFTH METACARPAL BONE, LEFT HAND, INITIAL ENCOUNTER FOR CLOSED FRACTURE: ICD-10-CM

## 2025-02-25 PROCEDURE — 73130 X-RAY EXAM OF HAND: CPT | Mod: LT

## 2025-02-25 PROCEDURE — 26600 TREAT METACARPAL FRACTURE: CPT

## 2025-02-25 PROCEDURE — 99213 OFFICE O/P EST LOW 20 MIN: CPT | Mod: 57

## 2025-03-11 ENCOUNTER — APPOINTMENT (OUTPATIENT)
Dept: ORTHOPEDIC SURGERY | Facility: CLINIC | Age: 60
End: 2025-03-11

## 2025-04-12 NOTE — H&P PST ADULT - NS PRO LACT YNNA
Problem: Respiratory Impairment - Respiratory Therapy 253  Intervention: Inhaled medication delivery  Note: Intervention Status  Done  Intervention: Inhaled gas administration  Note: Intervention Status  Done  Intervention: Respiratory support devices  Note: Intervention Status  Done      no

## 2025-05-22 ENCOUNTER — NON-APPOINTMENT (OUTPATIENT)
Age: 60
End: 2025-05-22

## 2025-05-23 ENCOUNTER — APPOINTMENT (OUTPATIENT)
Dept: OBGYN | Facility: CLINIC | Age: 60
End: 2025-05-23
Payer: COMMERCIAL

## 2025-05-23 ENCOUNTER — NON-APPOINTMENT (OUTPATIENT)
Age: 60
End: 2025-05-23

## 2025-05-23 VITALS
BODY MASS INDEX: 23.92 KG/M2 | HEIGHT: 63 IN | DIASTOLIC BLOOD PRESSURE: 79 MMHG | WEIGHT: 135 LBS | SYSTOLIC BLOOD PRESSURE: 119 MMHG

## 2025-05-23 DIAGNOSIS — N95.1 MENOPAUSAL AND FEMALE CLIMACTERIC STATES: ICD-10-CM

## 2025-05-23 DIAGNOSIS — Z01.419 ENCOUNTER FOR GYNECOLOGICAL EXAMINATION (GENERAL) (ROUTINE) W/OUT ABNORMAL FINDINGS: ICD-10-CM

## 2025-05-23 PROCEDURE — 99203 OFFICE O/P NEW LOW 30 MIN: CPT | Mod: 25

## 2025-05-23 PROCEDURE — 99386 PREV VISIT NEW AGE 40-64: CPT

## 2025-05-23 PROCEDURE — G0444 DEPRESSION SCREEN ANNUAL: CPT | Mod: 59

## 2025-05-23 RX ORDER — ESTRADIOL 0.1 MG/D
0.1 PATCH TRANSDERMAL
Qty: 1 | Refills: 3 | Status: ACTIVE | COMMUNITY
Start: 2025-05-23 | End: 1900-01-01

## 2025-05-23 RX ORDER — PROGESTERONE 100 MG/1
100 CAPSULE ORAL
Qty: 90 | Refills: 1 | Status: ACTIVE | COMMUNITY
Start: 2025-05-23 | End: 1900-01-01

## 2025-05-27 LAB — HPV HIGH+LOW RISK DNA PNL CVX: NOT DETECTED

## 2025-05-30 LAB — CYTOLOGY CVX/VAG DOC THIN PREP: ABNORMAL

## 2025-06-06 ENCOUNTER — APPOINTMENT (OUTPATIENT)
Dept: OBGYN | Facility: CLINIC | Age: 60
End: 2025-06-06

## 2025-06-26 ENCOUNTER — APPOINTMENT (OUTPATIENT)
Dept: OBGYN | Facility: CLINIC | Age: 60
End: 2025-06-26

## 2025-07-03 ENCOUNTER — APPOINTMENT (OUTPATIENT)
Dept: OBGYN | Facility: CLINIC | Age: 60
End: 2025-07-03
Payer: COMMERCIAL

## 2025-07-03 PROCEDURE — 99213 OFFICE O/P EST LOW 20 MIN: CPT | Mod: 95

## 2025-07-03 RX ORDER — ESTRADIOL AND NORETHINDRONE ACETATE 1; .5 MG/1; MG/1
1-0.5 TABLET, FILM COATED ORAL
Qty: 84 | Refills: 1 | Status: ACTIVE | COMMUNITY
Start: 2025-07-03 | End: 1900-01-01

## 2025-07-14 ENCOUNTER — NON-APPOINTMENT (OUTPATIENT)
Age: 60
End: 2025-07-14

## 2025-07-15 ENCOUNTER — APPOINTMENT (OUTPATIENT)
Dept: OBGYN | Facility: CLINIC | Age: 60
End: 2025-07-15

## 2025-07-16 ENCOUNTER — APPOINTMENT (OUTPATIENT)
Dept: OBGYN | Facility: CLINIC | Age: 60
End: 2025-07-16

## 2025-07-17 ENCOUNTER — APPOINTMENT (OUTPATIENT)
Dept: OBGYN | Facility: CLINIC | Age: 60
End: 2025-07-17

## 2025-08-06 ENCOUNTER — NON-APPOINTMENT (OUTPATIENT)
Age: 60
End: 2025-08-06

## 2025-08-08 ENCOUNTER — NON-APPOINTMENT (OUTPATIENT)
Age: 60
End: 2025-08-08

## 2025-08-20 ENCOUNTER — APPOINTMENT (OUTPATIENT)
Dept: OBGYN | Facility: CLINIC | Age: 60
End: 2025-08-20

## (undated) DEVICE — LAP PAD 18 X 18"

## (undated) DEVICE — SOL IRR POUR H2O 1500ML

## (undated) DEVICE — PACK MAJOR ABDOMINAL WITH LAP

## (undated) DEVICE — Device

## (undated) DEVICE — ELCTR GROUNDING PAD ADULT COVIDIEN

## (undated) DEVICE — DRAPE FLUID WARMER 44 X 44"

## (undated) DEVICE — DRSG TEGADERM 2.5X3"

## (undated) DEVICE — SUT VICRYL 0 36" CT-1 UNDYED

## (undated) DEVICE — PACK ABDOMINAL CLOSURE

## (undated) DEVICE — SUT VICRYL 0 18" TIES UNDYED

## (undated) DEVICE — LIGASURE IMPACT

## (undated) DEVICE — SOL IRR POUR NS 0.9% 1500ML

## (undated) DEVICE — SUT VICRYL 2-0 18" TIES UNDYED

## (undated) DEVICE — STAPLER SKIN MULTI DIRECTION W35

## (undated) DEVICE — SUT NOVAFIL 2 60" T-60

## (undated) DEVICE — VENODYNE/SCD SLEEVE CALF MEDIUM

## (undated) DEVICE — SUT PROLENE 2 60" TP-1

## (undated) DEVICE — POSITIONER STRAP ARMBOARD VELCRO TS-30

## (undated) DEVICE — POSITIONER FOAM EGG CRATE ULNAR 2PCS (PINK)

## (undated) DEVICE — LUBRICATING JELLY ONESHOT 1.25OZ

## (undated) DEVICE — GLV 7.5 PROTEXIS (CREAM) MICRO

## (undated) DEVICE — GLV 7.5 PROTEXIS (WHITE)

## (undated) DEVICE — WARMING BLANKET FULL ADULT

## (undated) DEVICE — DRAPE IOBAN 23" X 23"

## (undated) DEVICE — PROTECTOR HEEL / ELBOW FLUFFY

## (undated) DEVICE — ELCTR BOVIE TIP BLADE INSULATED 6.5" EDGE

## (undated) DEVICE — DRAPE LAPAROTOMY W VELCRO CORD TABS

## (undated) DEVICE — TUBING SMARTCAP ENDO OLYMPUS 140/160/180/190 2"

## (undated) DEVICE — FOLEY TRAY 16FR 5CC LF UMETER CLOSED

## (undated) DEVICE — DRAIN RESERVOIR FOR JACKSON PRATT 100CC CARDINAL

## (undated) DEVICE — SUT VICRYL 2-0 27" SH UNDYED

## (undated) DEVICE — POOLE SUCTION TIP

## (undated) DEVICE — DRSG AQUACEL 3.5 X 14"

## (undated) DEVICE — SUT SILK 3-0 18" SH (POP-OFF)

## (undated) DEVICE — SUT VICRYL 3-0 18" SH UNDYED (POP-OFF)

## (undated) DEVICE — STERIS DEFENDO 3-PIECE KIT (AIR/WATER, SUCTION & BIOPSY VALVES)

## (undated) DEVICE — POSITIONER PINK PAD PIGAZZI SYSTEM

## (undated) DEVICE — ELCTR BOVIE PENCIL SMOKE EVACUATION

## (undated) DEVICE — SUT VICRYL 0 27" CT-1 UNDYED